# Patient Record
Sex: FEMALE | Race: WHITE | NOT HISPANIC OR LATINO | Employment: PART TIME | URBAN - METROPOLITAN AREA
[De-identification: names, ages, dates, MRNs, and addresses within clinical notes are randomized per-mention and may not be internally consistent; named-entity substitution may affect disease eponyms.]

---

## 2018-08-23 ENCOUNTER — OFFICE VISIT (OUTPATIENT)
Dept: INTERNAL MEDICINE | Age: 48
End: 2018-08-23

## 2018-08-23 VITALS
TEMPERATURE: 98.2 F | OXYGEN SATURATION: 99 % | WEIGHT: 289 LBS | BODY MASS INDEX: 56.74 KG/M2 | HEART RATE: 112 BPM | DIASTOLIC BLOOD PRESSURE: 84 MMHG | SYSTOLIC BLOOD PRESSURE: 122 MMHG | HEIGHT: 60 IN

## 2018-08-23 DIAGNOSIS — R06.83 SNORING: ICD-10-CM

## 2018-08-23 DIAGNOSIS — Z00.00 ROUTINE HEALTH MAINTENANCE: ICD-10-CM

## 2018-08-23 DIAGNOSIS — J30.89 ENVIRONMENTAL AND SEASONAL ALLERGIES: Chronic | ICD-10-CM

## 2018-08-23 DIAGNOSIS — G89.29 CHRONIC LEFT-SIDED LOW BACK PAIN WITHOUT SCIATICA: Chronic | ICD-10-CM

## 2018-08-23 DIAGNOSIS — E66.01 MORBID OBESITY (HCC): Primary | Chronic | ICD-10-CM

## 2018-08-23 DIAGNOSIS — F90.2 ATTENTION DEFICIT HYPERACTIVITY DISORDER (ADHD), COMBINED TYPE: Chronic | ICD-10-CM

## 2018-08-23 DIAGNOSIS — M54.50 CHRONIC LEFT-SIDED LOW BACK PAIN WITHOUT SCIATICA: Chronic | ICD-10-CM

## 2018-08-23 DIAGNOSIS — J45.20 MILD INTERMITTENT ASTHMA WITHOUT COMPLICATION: Chronic | ICD-10-CM

## 2018-08-23 PROBLEM — F51.04 CHRONIC INSOMNIA: Chronic | Status: ACTIVE | Noted: 2018-08-23

## 2018-08-23 PROBLEM — J45.909 ASTHMA: Status: ACTIVE | Noted: 2018-08-23

## 2018-08-23 PROBLEM — J45.909 ASTHMA: Chronic | Status: ACTIVE | Noted: 2018-08-23

## 2018-08-23 PROCEDURE — 99204 OFFICE O/P NEW MOD 45 MIN: CPT | Performed by: INTERNAL MEDICINE

## 2018-08-23 RX ORDER — NAPROXEN 500 MG/1
500 TABLET ORAL
COMMUNITY
Start: 2018-04-03 | End: 2018-08-23

## 2018-08-23 RX ORDER — FLUTICASONE PROPIONATE 50 MCG
1 SPRAY, SUSPENSION (ML) NASAL 2 TIMES DAILY
Qty: 1 BOTTLE | Refills: 5 | Status: SHIPPED | OUTPATIENT
Start: 2018-08-23

## 2018-08-23 RX ORDER — ALBUTEROL SULFATE 90 UG/1
2 AEROSOL, METERED RESPIRATORY (INHALATION) EVERY 4 HOURS PRN
Qty: 1 INHALER | Refills: 2 | Status: SHIPPED | OUTPATIENT
Start: 2018-08-23 | End: 2022-01-27 | Stop reason: SDUPTHER

## 2018-08-23 NOTE — ASSESSMENT & PLAN NOTE
· The following were discussed: low calorie, low carb based diet program, portion control, make dinner lightest meal of day, avoid eating sweets, desserts, and excess amount of fruits and increase physical activity, monitor steps taken daily

## 2018-08-23 NOTE — ASSESSMENT & PLAN NOTE
Persistent sacral and lumbar region back pain. Xray from [facility] reviewed showing spondylosis but no vertebral compression fracture. Started after a fall previously. Recommended PT and OTC NSAIDs as needed.

## 2018-08-24 LAB
ALBUMIN SERPL-MCNC: 4.4 G/DL (ref 3.5–5.2)
ALBUMIN/GLOB SERPL: 1.5 G/DL
ALP SERPL-CCNC: 70 U/L (ref 39–117)
ALT SERPL-CCNC: 21 U/L (ref 1–33)
AST SERPL-CCNC: 16 U/L (ref 1–32)
BASOPHILS # BLD AUTO: 0.03 10*3/MM3 (ref 0–0.2)
BASOPHILS NFR BLD AUTO: 0.3 % (ref 0–1.5)
BILIRUB SERPL-MCNC: 0.4 MG/DL (ref 0.1–1.2)
BUN SERPL-MCNC: 8 MG/DL (ref 6–20)
BUN/CREAT SERPL: 8.9 (ref 7–25)
CALCIUM SERPL-MCNC: 8.9 MG/DL (ref 8.6–10.5)
CHLORIDE SERPL-SCNC: 102 MMOL/L (ref 98–107)
CHOLEST SERPL-MCNC: 202 MG/DL (ref 0–200)
CHOLEST/HDLC SERPL: 4.04 {RATIO}
CO2 SERPL-SCNC: 24.7 MMOL/L (ref 22–29)
CREAT SERPL-MCNC: 0.9 MG/DL (ref 0.57–1)
EOSINOPHIL # BLD AUTO: 0.08 10*3/MM3 (ref 0–0.7)
EOSINOPHIL NFR BLD AUTO: 0.8 % (ref 0.3–6.2)
ERYTHROCYTE [DISTWIDTH] IN BLOOD BY AUTOMATED COUNT: 13.3 % (ref 11.7–13)
GLOBULIN SER CALC-MCNC: 3 GM/DL
GLUCOSE SERPL-MCNC: 95 MG/DL (ref 65–99)
HBA1C MFR BLD: 5.4 % (ref 4.8–5.6)
HCT VFR BLD AUTO: 42.4 % (ref 35.6–45.5)
HDLC SERPL-MCNC: 50 MG/DL (ref 40–60)
HGB BLD-MCNC: 13.6 G/DL (ref 11.9–15.5)
IMM GRANULOCYTES # BLD: 0.03 10*3/MM3 (ref 0–0.03)
IMM GRANULOCYTES NFR BLD: 0.3 % (ref 0–0.5)
LDLC SERPL CALC-MCNC: 130 MG/DL (ref 0–100)
LYMPHOCYTES # BLD AUTO: 2.17 10*3/MM3 (ref 0.9–4.8)
LYMPHOCYTES NFR BLD AUTO: 22.4 % (ref 19.6–45.3)
MCH RBC QN AUTO: 31.2 PG (ref 26.9–32)
MCHC RBC AUTO-ENTMCNC: 32.1 G/DL (ref 32.4–36.3)
MCV RBC AUTO: 97.2 FL (ref 80.5–98.2)
MONOCYTES # BLD AUTO: 0.54 10*3/MM3 (ref 0.2–1.2)
MONOCYTES NFR BLD AUTO: 5.6 % (ref 5–12)
NEUTROPHILS # BLD AUTO: 6.86 10*3/MM3 (ref 1.9–8.1)
NEUTROPHILS NFR BLD AUTO: 70.9 % (ref 42.7–76)
PLATELET # BLD AUTO: 318 10*3/MM3 (ref 140–500)
POTASSIUM SERPL-SCNC: 4.3 MMOL/L (ref 3.5–5.2)
PROT SERPL-MCNC: 7.4 G/DL (ref 6–8.5)
RBC # BLD AUTO: 4.36 10*6/MM3 (ref 3.9–5.2)
SODIUM SERPL-SCNC: 139 MMOL/L (ref 136–145)
T4 FREE SERPL-MCNC: 0.76 NG/DL (ref 0.93–1.7)
TRIGL SERPL-MCNC: 111 MG/DL (ref 0–150)
TSH SERPL DL<=0.005 MIU/L-ACNC: 15.19 MIU/ML (ref 0.27–4.2)
VLDLC SERPL CALC-MCNC: 22.2 MG/DL (ref 5–40)
WBC # BLD AUTO: 9.68 10*3/MM3 (ref 4.5–10.7)

## 2018-08-24 NOTE — PROGRESS NOTES
Call patient with her test result(s) and mail the results to her if MyChart is NOT active.    1.Thyroid level is low consistent with hypothyroidism. Start levothyroxine 50 mcg qAM. (Dx: hypothyroidism) (Instruct patient on proper way to take medication.) Recheck TSH and Free T4 in 2 months.   2. No diabetes.  3. Cholesterol is mildly elevated. Maintain low fat/cholesterol diet.  Will follow.   4. CBC okay.    Keep follow-up with me for January.

## 2018-08-27 ENCOUNTER — TELEPHONE (OUTPATIENT)
Dept: INTERNAL MEDICINE | Age: 48
End: 2018-08-27

## 2018-08-27 DIAGNOSIS — E03.9 ACQUIRED HYPOTHYROIDISM: Primary | ICD-10-CM

## 2018-08-27 RX ORDER — LEVOTHYROXINE SODIUM 0.05 MG/1
50 TABLET ORAL DAILY
Qty: 30 TABLET | Refills: 3 | Status: SHIPPED | OUTPATIENT
Start: 2018-08-27 | End: 2018-10-25 | Stop reason: SDUPTHER

## 2018-08-27 NOTE — ASSESSMENT & PLAN NOTE
Pt to start levothyroxine 50 mcg, instructed proper way to take, and pt to recheck TFT's in 2 months. YOU

## 2018-08-27 NOTE — TELEPHONE ENCOUNTER
----- Message from Luiz Uribe MD sent at 8/24/2018  5:06 PM EDT -----  Call patient with her test result(s) and mail the results to her if MyChart is NOT active.    1.Thyroid level is low consistent with hypothyroidism. Start levothyroxine 50 mcg qAM. (Dx: hypothyroidism) (Instruct patient on proper way to take medication.) Recheck TSH and Free T4 in 2 months.   2. No diabetes.  3. Cholesterol is mildly elevated. Maintain low fat/cholesterol diet.  Will follow.   4. CBC okay.    Keep follow-up with me for January.

## 2018-08-28 ENCOUNTER — APPOINTMENT (OUTPATIENT)
Dept: WOMENS IMAGING | Facility: HOSPITAL | Age: 48
End: 2018-08-28

## 2018-08-28 PROCEDURE — 77067 SCR MAMMO BI INCL CAD: CPT | Performed by: RADIOLOGY

## 2018-08-28 PROCEDURE — 77063 BREAST TOMOSYNTHESIS BI: CPT | Performed by: RADIOLOGY

## 2018-10-12 ENCOUNTER — APPOINTMENT (OUTPATIENT)
Dept: SLEEP MEDICINE | Facility: HOSPITAL | Age: 48
End: 2018-10-12

## 2018-10-23 DIAGNOSIS — E03.9 ACQUIRED HYPOTHYROIDISM: Primary | ICD-10-CM

## 2018-10-25 DIAGNOSIS — E03.9 ACQUIRED HYPOTHYROIDISM: ICD-10-CM

## 2018-10-25 RX ORDER — LEVOTHYROXINE SODIUM 0.1 MG/1
50 TABLET ORAL DAILY
Qty: 30 TABLET | Refills: 3 | Status: SHIPPED | OUTPATIENT
Start: 2018-10-25 | End: 2018-12-14 | Stop reason: SDUPTHER

## 2018-10-25 NOTE — PROGRESS NOTES
Call patient with her test result(s) and mail the results to her if MyChart is NOT active.    Thyroid level is still quite low. Increase levothyroxine to 100 mcg qAM (verify on 50 mcg). Recheck TFT's in 6 weeks along with thyroid peroxidase antibody.  Keep appointment with me for January.

## 2018-10-25 NOTE — ASSESSMENT & PLAN NOTE
Pt to increase levothyroxine to 100 mcg and recheck TFT's including thyroid peroxidase antibody per Dr Uribe. YOU

## 2018-10-26 LAB
T4 FREE SERPL-MCNC: 0.88 NG/DL (ref 0.93–1.7)
TSH SERPL DL<=0.005 MIU/L-ACNC: 30.18 MIU/ML (ref 0.27–4.2)

## 2018-12-12 DIAGNOSIS — E03.9 ACQUIRED HYPOTHYROIDISM: Primary | ICD-10-CM

## 2018-12-13 LAB
T4 FREE SERPL-MCNC: 1.05 NG/DL (ref 0.93–1.7)
TSH SERPL DL<=0.005 MIU/L-ACNC: 27.92 MIU/ML (ref 0.27–4.2)

## 2018-12-14 ENCOUNTER — TELEPHONE (OUTPATIENT)
Dept: INTERNAL MEDICINE | Age: 48
End: 2018-12-14

## 2018-12-14 DIAGNOSIS — E03.9 ACQUIRED HYPOTHYROIDISM: ICD-10-CM

## 2018-12-14 LAB
Lab: NORMAL
Lab: NORMAL

## 2018-12-14 RX ORDER — LEVOTHYROXINE SODIUM 0.05 MG/1
150 TABLET ORAL DAILY
Qty: 90 TABLET | Refills: 0 | Status: SHIPPED | OUTPATIENT
Start: 2018-12-14 | End: 2019-01-11 | Stop reason: SDUPTHER

## 2018-12-14 NOTE — TELEPHONE ENCOUNTER
----- Message from Luiz Uribe MD sent at 12/14/2018  8:04 AM EST -----  Call patient with her test result(s) and mail the results to her if MyChart is NOT active.    Increase levothyroxine to 125 mcg (confirm on 100 mcg). Recheck TFT's 2 months     Refer to separate staff message about adding thyroid peroxidase.

## 2018-12-14 NOTE — TELEPHONE ENCOUNTER
Results sent to my chart for review. Medication sent to local pharmacy for pickup. Pt to increase levothyroxine to 125 mcg and recheck thyroid labs in 2 months. Per Dr Uribe. YOU

## 2018-12-15 LAB
THYROPEROXIDASE AB SERPL-ACNC: 340 IU/ML (ref 0–34)
WRITTEN AUTHORIZATION: NORMAL

## 2019-01-09 DIAGNOSIS — Z00.00 PREVENTATIVE HEALTH CARE: Primary | ICD-10-CM

## 2019-01-10 LAB
ALBUMIN SERPL-MCNC: 4.1 G/DL (ref 3.5–5.2)
ALBUMIN/GLOB SERPL: 1.3 G/DL
ALP SERPL-CCNC: 66 U/L (ref 39–117)
ALT SERPL-CCNC: 16 U/L (ref 1–33)
APPEARANCE UR: CLEAR
AST SERPL-CCNC: 15 U/L (ref 1–32)
BASOPHILS # BLD AUTO: 0.03 10*3/MM3 (ref 0–0.2)
BASOPHILS NFR BLD AUTO: 0.3 % (ref 0–1.5)
BILIRUB SERPL-MCNC: 0.2 MG/DL (ref 0.1–1.2)
BILIRUB UR QL STRIP: NEGATIVE
BUN SERPL-MCNC: 10 MG/DL (ref 6–20)
BUN/CREAT SERPL: 11 (ref 7–25)
CALCIUM SERPL-MCNC: 9.5 MG/DL (ref 8.6–10.5)
CHLORIDE SERPL-SCNC: 101 MMOL/L (ref 98–107)
CHOLEST SERPL-MCNC: 180 MG/DL (ref 0–200)
CHOLEST/HDLC SERPL: 3.46 {RATIO}
CO2 SERPL-SCNC: 26.5 MMOL/L (ref 22–29)
COLOR UR: YELLOW
CREAT SERPL-MCNC: 0.91 MG/DL (ref 0.57–1)
EOSINOPHIL # BLD AUTO: 0.07 10*3/MM3 (ref 0–0.7)
EOSINOPHIL NFR BLD AUTO: 0.8 % (ref 0.3–6.2)
ERYTHROCYTE [DISTWIDTH] IN BLOOD BY AUTOMATED COUNT: 14.4 % (ref 11.7–13)
GLOBULIN SER CALC-MCNC: 3.1 GM/DL
GLUCOSE SERPL-MCNC: 94 MG/DL (ref 65–99)
GLUCOSE UR QL: NEGATIVE
HBA1C MFR BLD: 5.2 % (ref 4.8–5.6)
HCT VFR BLD AUTO: 44.7 % (ref 35.6–45.5)
HDLC SERPL-MCNC: 52 MG/DL (ref 40–60)
HGB BLD-MCNC: 13.7 G/DL (ref 11.9–15.5)
HGB UR QL STRIP: NEGATIVE
IMM GRANULOCYTES # BLD AUTO: 0.03 10*3/MM3 (ref 0–0.03)
IMM GRANULOCYTES NFR BLD AUTO: 0.3 % (ref 0–0.5)
KETONES UR QL STRIP: NEGATIVE
LDLC SERPL CALC-MCNC: 112 MG/DL (ref 0–100)
LEUKOCYTE ESTERASE UR QL STRIP: NEGATIVE
LYMPHOCYTES # BLD AUTO: 2.24 10*3/MM3 (ref 0.9–4.8)
LYMPHOCYTES NFR BLD AUTO: 24.2 % (ref 19.6–45.3)
MCH RBC QN AUTO: 30.6 PG (ref 26.9–32)
MCHC RBC AUTO-ENTMCNC: 30.6 G/DL (ref 32.4–36.3)
MCV RBC AUTO: 99.8 FL (ref 80.5–98.2)
MONOCYTES # BLD AUTO: 0.6 10*3/MM3 (ref 0.2–1.2)
MONOCYTES NFR BLD AUTO: 6.5 % (ref 5–12)
NEUTROPHILS # BLD AUTO: 6.29 10*3/MM3 (ref 1.9–8.1)
NEUTROPHILS NFR BLD AUTO: 67.9 % (ref 42.7–76)
NITRITE UR QL STRIP: NEGATIVE
PH UR STRIP: 6 [PH] (ref 5–8)
PLATELET # BLD AUTO: 303 10*3/MM3 (ref 140–500)
POTASSIUM SERPL-SCNC: 4.3 MMOL/L (ref 3.5–5.2)
PROT SERPL-MCNC: 7.2 G/DL (ref 6–8.5)
PROT UR QL STRIP: NEGATIVE
RBC # BLD AUTO: 4.48 10*6/MM3 (ref 3.9–5.2)
SODIUM SERPL-SCNC: 141 MMOL/L (ref 136–145)
SP GR UR: 1.02 (ref 1–1.03)
T4 FREE SERPL-MCNC: 0.37 NG/DL (ref 0.93–1.7)
TRIGL SERPL-MCNC: 78 MG/DL (ref 0–150)
TSH SERPL DL<=0.005 MIU/L-ACNC: 18.36 MIU/ML (ref 0.27–4.2)
UROBILINOGEN UR STRIP-MCNC: NORMAL MG/DL
VLDLC SERPL CALC-MCNC: 15.6 MG/DL (ref 5–40)
WBC # BLD AUTO: 9.26 10*3/MM3 (ref 4.5–10.7)

## 2019-01-11 ENCOUNTER — TELEPHONE (OUTPATIENT)
Dept: INTERNAL MEDICINE | Age: 49
End: 2019-01-11

## 2019-01-11 DIAGNOSIS — E03.9 ACQUIRED HYPOTHYROIDISM: ICD-10-CM

## 2019-01-11 RX ORDER — LEVOTHYROXINE SODIUM 0.15 MG/1
150 TABLET ORAL DAILY
Qty: 30 TABLET | Refills: 3 | Status: SHIPPED | OUTPATIENT
Start: 2019-01-11 | End: 2019-07-18 | Stop reason: SDUPTHER

## 2019-01-11 NOTE — TELEPHONE ENCOUNTER
Called pt with results    Please advise pt was supposed to be taking 125mcg daily and not 50 mcg stated on result not.     She picked up a prescription that stated 50 mcg 3 times daily 150 mcg total. That's  What she has been taking.    She has been speaking with Dr Stephen alvarez ENT, endo specialist who is her brother in law in [place], and he told her to stop all meds and start liothyronine 25 mcg 1 week ago before labs for her CPE was drawn. She states that she is feeling horrible and doesn't know what to do. But she will listen to wwhatever Dr Uribe would like to do. She states that Dr Alvarez was going to try to contact you to talk about some spesific lab to run on her.

## 2019-01-11 NOTE — TELEPHONE ENCOUNTER
----- Message from Luiz Uribe MD sent at 1/11/2019  7:19 AM EST -----  Call patient with her test result(s) and mail the results to her if MyChart is NOT active.    Upcoming CPE labs reviewed. Thyroid is too low. Increase levothyroxine to 88 mcg (verify on 50 mcg). Recheck TSH and Free T4 in 2 months.   Will see you for CPE.

## 2019-01-11 NOTE — TELEPHONE ENCOUNTER
Called pt with results     Please advise pt was supposed to be taking 125mcg daily and not 50 mcg as stated to increase to 88 mcg stated on result note.      She picked up a prescription that stated 50 mcg 3 times daily 150 mcg total. That's  What she has been taking.     She has been speaking with Dr Stephen alvarez ENT, endo specialist who is her brother in law in [place], and he told her to stop all meds and start liothyronine 25 mcg 1 week ago before labs for her CPE was drawn. She states that she is feeling horrible and doesn't know what to do. But she will listen to wwhatever Dr Uribe would like to do. She states that Dr Alvarez was going to try to contact you to talk about some spesific lab to run on her.

## 2019-01-15 ENCOUNTER — OFFICE VISIT (OUTPATIENT)
Dept: INTERNAL MEDICINE | Age: 49
End: 2019-01-15

## 2019-01-15 VITALS
HEIGHT: 60 IN | DIASTOLIC BLOOD PRESSURE: 78 MMHG | OXYGEN SATURATION: 98 % | BODY MASS INDEX: 54.97 KG/M2 | TEMPERATURE: 98.2 F | HEART RATE: 94 BPM | WEIGHT: 280 LBS | SYSTOLIC BLOOD PRESSURE: 126 MMHG

## 2019-01-15 DIAGNOSIS — B00.1 COLD SORE: ICD-10-CM

## 2019-01-15 DIAGNOSIS — E66.01 MORBID OBESITY (HCC): Chronic | ICD-10-CM

## 2019-01-15 DIAGNOSIS — Z00.00 ENCOUNTER FOR ANNUAL HEALTH EXAMINATION: Primary | ICD-10-CM

## 2019-01-15 DIAGNOSIS — E06.3 HYPOTHYROIDISM DUE TO HASHIMOTO'S THYROIDITIS: Chronic | ICD-10-CM

## 2019-01-15 DIAGNOSIS — E03.8 HYPOTHYROIDISM DUE TO HASHIMOTO'S THYROIDITIS: Chronic | ICD-10-CM

## 2019-01-15 PROCEDURE — 99213 OFFICE O/P EST LOW 20 MIN: CPT | Performed by: INTERNAL MEDICINE

## 2019-01-15 PROCEDURE — 99396 PREV VISIT EST AGE 40-64: CPT | Performed by: INTERNAL MEDICINE

## 2019-01-15 PROCEDURE — 90632 HEPA VACCINE ADULT IM: CPT | Performed by: INTERNAL MEDICINE

## 2019-01-15 PROCEDURE — 90471 IMMUNIZATION ADMIN: CPT | Performed by: INTERNAL MEDICINE

## 2019-01-15 RX ORDER — VALACYCLOVIR HYDROCHLORIDE 1 G/1
TABLET, FILM COATED ORAL
Qty: 4 TABLET | Refills: 0 | Status: SHIPPED | OUTPATIENT
Start: 2019-01-15 | End: 2019-02-01

## 2019-01-15 RX ORDER — LIOTHYRONINE SODIUM 25 UG/1
TABLET ORAL
Refills: 1 | COMMUNITY
Start: 2018-12-24 | End: 2019-01-15

## 2019-01-15 NOTE — ASSESSMENT & PLAN NOTE
Previously she was taking 150 mcg levothyroxine (3 x 50 mcg, error by MA). Her brother-in-law (ENT) started her on Cytomel 25 mcg and told her to stop levothyroxine). We clarified the issue with patient on 1/11/19 and resumed levothyroxine at 150 mcg dose once daily.     Will recheck TSH and Free T4 in February (scheduled already).

## 2019-01-15 NOTE — ASSESSMENT & PLAN NOTE
Wt Readings from Last 3 Encounters:   01/15/19 127 kg (280 lb)   08/23/18 131 kg (289 lb)      Maintain a low sugar/starch/carbohydrate diet and exercise regularly. Wt loss advised.

## 2019-01-15 NOTE — PROGRESS NOTES
[facility]  SHARRI FARRIS M.D.      Maria Esther HENRIQUEZ Godfrey / 48 y.o. / female  01/15/2019    CC:  Annual Exam and URI (x 2 days)      HPI:      Maria Esther presents for annual health maintenance visit.    There was a mix up in thyroid medication regimen and was taking levothyroxine 50 mcg THREE tabs daily (total of 150 mcg).  Her ENT brother in law had her stop this and start Cytomel 25 mcg. Her labs were low and was recently just started back on levothyroxine 150 mcg.   Thyroid peroxidase antibody was positive consistent with Hashimoto's.   Overall she is feeling better.     Complains of cold sore on lower lip. Has a viral URI currently.     · Last health maintenance visit: unsure  · General health: poor  · Lifestyle:  · Attempting to lose weight?: Yes   · Diet: could be better  · Exercise: limited currently  · Tobacco: Remote history (short-term)   · Alcohol: occasional/rare  · Work: Full-time  · Reproductive health:  · Sexually active?: Yes   · Sexual problems?: No problems  · Concern for STD?: No    · Sees Gynecologist?: Yes   · Livier/Postmenopausal?: No   · Domestic abuse concerns: No   · Depression Screening:      PHQ-2/PHQ-9 Depression Screening 1/15/2019   Little interest or pleasure in doing things 0   Feeling down, depressed, or hopeless 0   Total Score 0         PHQ-2: 0 (Not depressed)   PHQ-9:     Patient Care Team:  Luiz Farris MD as PCP - General (Internal Medicine)  ______________________________________________________________________    ALLERGIES  Allergies   Allergen Reactions   • Apple Hives   • Peanut Oil Anaphylaxis   • Penicillins Hives and Shortness Of Breath   • Codeine Hives and Nausea And Vomiting   • Epinephrine Nausea And Vomiting, Palpitations and Photosensitivity        MEDICATIONS  Current Outpatient Medications on File Prior to Visit   Medication Sig   • albuterol (VENTOLIN HFA) 108 (90 Base) MCG/ACT inhaler Inhale 2 puffs Every 4 (Four) Hours As Needed for Wheezing or Shortness of Air.   •  Cholecalciferol (VITAMIN D3) 5000 units capsule capsule Take 5,000 Units by mouth Daily.   • fluticasone (FLONASE) 50 MCG/ACT nasal spray 1 spray into the nostril(s) as directed by provider 2 (Two) Times a Day.   • levothyroxine (SYNTHROID, LEVOTHROID) 150 MCG tablet Take 1 tablet by mouth Daily.   • lisdexamfetamine (VYVANSE) 50 MG capsule Take 50 mg by mouth   • [DISCONTINUED] liothyronine (CYTOMEL) 25 MCG tablet      No current facility-administered medications on file prior to visit.        PFSH:     The following portions of the patient's history were reviewed and updated as appropriate: Allergies / Current Medications / Past Medical History / Surgical History / Social History / Family History    PROBLEM LIST   Patient Active Problem List   Diagnosis   • Asthma   • Environmental and seasonal allergies   • Morbid obesity (CMS/HCC)   • Attention deficit hyperactivity disorder (ADHD), combined type   • Chronic left-sided low back pain   • Chronic insomnia   • Hypothyroidism due to Hashimoto's thyroiditis   • Cold sore       PAST MEDICAL HISTORY  Past Medical History:   Diagnosis Date   • ADHD (attention deficit hyperactivity disorder)    • Asthma    • History of bulimia     teenage years/young adult   • Hypothyroidism    • Insomnia    • Obesity        SURGICAL HISTORY  Past Surgical History:   Procedure Laterality Date   • BREAST AUGMENTATION      twice ( & )   • LIPOSUCTION     • UMBILICAL HERNIA REPAIR         SOCIAL HISTORY  Social History     Socioeconomic History   • Marital status:      Spouse name: Paco*   • Number of children: 0   • Years of education: Not on file   • Highest education level: Not on file   Occupational History   • Occupation: RN      Comment: Psychiatric nurse (adolescent)   Tobacco Use   • Smoking status: Former Smoker     Years: 5.00     Types: Cigarettes     Last attempt to quit: 2014     Years since quittin.0   • Smokeless tobacco: Never Used   Substance and  "Sexual Activity   • Alcohol use: No   • Drug use: No   • Sexual activity: Yes     Partners: Male       FAMILY HISTORY  Family History   Problem Relation Age of Onset   • Kidney disease Mother    • Valvular heart disease Mother    • Alzheimer's disease Father 70   • Bipolar disorder Father    • Alzheimer's disease Paternal Aunt 70   • Bipolar disorder Sister    • Bipolar disorder Brother    • Bipolar disorder Sister    • ADD / ADHD Sister    • Cervical cancer Maternal Grandmother    • Alzheimer's disease Paternal Grandmother    • Colon cancer Neg Hx    • Breast cancer Neg Hx    • Diabetes Neg Hx        IMMUNIZATION HISTORY  Immunization History   Administered Date(s) Administered   • Flu Vaccine Quad PF >18YRS 09/26/2018   • Tdap 01/01/2016       ______________________________________________________________________    REVIEW OF SYSTEMS    Review of Systems   Constitutional: Negative.    HENT: Negative.         Cold sore   Eyes: Negative.    Respiratory: Negative.    Cardiovascular: Negative.    Gastrointestinal: Negative.    Endocrine: Negative.    Genitourinary: Negative.    Musculoskeletal: Negative.    Skin: Negative.    Allergic/Immunologic: Positive for environmental allergies.   Neurological: Negative.    Hematological: Negative.    Psychiatric/Behavioral: Positive for sleep disturbance.         VITALS:    Visit Vitals  /78   Pulse 94   Temp 98.2 °F (36.8 °C)   Ht 151.8 cm (59.76\")   Wt 127 kg (280 lb)   SpO2 98%   BMI 55.12 kg/m²       BP Readings from Last 3 Encounters:   01/15/19 126/78   08/23/18 122/84     Wt Readings from Last 3 Encounters:   01/15/19 127 kg (280 lb)   08/23/18 131 kg (289 lb)      Body mass index is 55.12 kg/m².    PHYSICAL EXAMINATION    Physical Exam   Constitutional: She is oriented to person, place, and time. She appears well-developed and well-nourished. No distress.   Obese    HENT:   Head: Normocephalic and atraumatic.   Right Ear: External ear normal.   Left Ear: External " ear normal.   Nose: Nose normal.   Mouth/Throat: Oropharynx is clear and moist.       Mellampati Airway Class 4    Eyes: Conjunctivae and EOM are normal. Pupils are equal, round, and reactive to light. No scleral icterus.   Neck: Normal range of motion. Neck supple. No tracheal deviation present. No thyroid mass and no thyromegaly present.   Cardiovascular: Normal rate, regular rhythm, normal heart sounds and intact distal pulses.   Pulmonary/Chest: Effort normal and breath sounds normal.   Abdominal: Soft. Bowel sounds are normal. She exhibits no distension and no mass. There is no tenderness. No hernia.   Protuberant     Genitourinary:   Genitourinary Comments: Deferred to gyne/by patient unless specified otherwise.    Musculoskeletal: She exhibits no edema or deformity.   Neurological: She is alert and oriented to person, place, and time. She has normal reflexes. No cranial nerve deficit. She exhibits normal muscle tone. Coordination normal.   Skin: Skin is warm. No rash noted. No pallor.   Psychiatric: She has a normal mood and affect. Her behavior is normal. Judgment and thought content normal.         REVIEWED DATA    Labs:    Lab Results   Component Value Date     01/10/2019    K 4.3 01/10/2019    AST 15 01/10/2019    ALT 16 01/10/2019    BUN 10 01/10/2019    CREATININE 0.91 01/10/2019    CREATININE 0.90 08/23/2018    EGFRIFNONA 66 01/10/2019    EGFRIFAFRI 80 01/10/2019       Lab Results   Component Value Date    HGBA1C 5.20 01/10/2019    HGBA1C 5.40 08/23/2018    TSH 18.360 (H) 01/10/2019    FREET4 0.37 (L) 01/10/2019       Lab Results   Component Value Date     (H) 01/10/2019    HDL 52 01/10/2019    TRIG 78 01/10/2019    CHOLHDLRATIO 3.46 01/10/2019       No results found for: [id number]     Lab Results   Component Value Date    WBC 9.26 01/10/2019    HGB 13.7 01/10/2019    MCV 99.8 (H) 01/10/2019     01/10/2019       Lab Results   Component Value Date    PROTEIN Negative 01/10/2019     GLUCOSEU Negative 01/10/2019    BLOODU Negative 01/10/2019    NITRITEU Negative 01/10/2019    LEUKOCYTESUR Negative 01/10/2019        No results found for: HEPCVIRUSABY    Imaging:        Medical Tests:      ______________________________________________________________________    ASSESSMENT & PLAN    ANNUAL WELLNESS EXAM / PHYSICAL     Other medical problems addressed today:  Problem List Items Addressed This Visit        High    Morbid obesity (CMS/HCC) (Chronic)    Current Assessment & Plan     Wt Readings from Last 3 Encounters:   01/15/19 127 kg (280 lb)   08/23/18 131 kg (289 lb)      Maintain a low sugar/starch/carbohydrate diet and exercise regularly. Wt loss advised.           Hypothyroidism due to Hashimoto's thyroiditis (Chronic)    Current Assessment & Plan     Previously she was taking 150 mcg levothyroxine (3 x 50 mcg, error by MA). Her brother-in-law (ENT) started her on Cytomel 25 mcg and told her to stop levothyroxine). We clarified the issue with patient on 1/11/19 and resumed levothyroxine at 150 mcg dose once daily.     Will recheck TSH and Free T4 in February (scheduled already).          Relevant Medications    levothyroxine (SYNTHROID, LEVOTHROID) 150 MCG tablet       Unprioritized    Cold sore    Current Assessment & Plan     Valtrex PRN. New Rx sent to her pharmacy.          Relevant Medications    valACYclovir (VALTREX) 1000 MG tablet      Other Visit Diagnoses     Encounter for annual health examination    -  Primary    Relevant Orders    Hepatitis A Vaccine Adult IM    Ambulatory Referral For Screening Colonoscopy          Summary/Discussion:     Primary reason for today's visit was for annual health examination. However above active medical issues also needed to be addressed today.        Return in about 6 months (around 7/15/2019) for Reassess chronic medical problems.    Future Appointments   Date Time Provider Department Center   2/12/2019  8:30 AM [facility] [address] [facility]  None       HEALTHCARE MAINTENANCE ISSUES:    Cancer Screening:  · Colon: Initial/Next screening at age: DUE NOW  · Repeat colonoscopy N/A at this time  · Breast: Recommended monthly self exams; annual professional exam  · Mammogram: every 1 year or every 2 years  · Cervical: Instructed to see gynecologist for pap smear; check every 2-3 years  · Skin: Monthly self skin examination, annual exam by health professional  · Lung:   · Other:    Screening Labs & Tests:  · Lab results reviewed & discussed with the patient or test orders placed today.  · EKG:  · Vascular Screening:   · DEXA (65+ or postmenopausal with risk factors):   · HEP C (If born [id number], or risk factors): Not indicated    Immunization/Vaccinations (to be given today unless deferred by patient)  · Influenza: Patient had the flu shot this season  · Hepatitis A: Administer today and 2nd shot in 6 months  · Tetanus/Pertussis: Up to date  · Pneumovax: Not needed at this time  · Prevnar 13: Not needed at this time  · Shingles: Not needed at this time  · Other:     Lifestyle Counseling:  · Lifestyle Modifications: Attempt to lose weight, Improve dietary compliance, Begin progressive aerobic exercise program 3-5 days a week, Maintain a low sugar/carbohydrate diet, Follow a low fat, low cholesterol diet and Make dinner the lightest meal of day  · Safety Issues: Always wear seatbelt, Avoid texting while driving   · Use sunscreen, regular skin examination  · Recommended annual dental/vision examination.  · Emotional/Stress/Sleep: Reviewed and  given when appropriate      Health Maintenance   Topic Date Due   • PAP SMEAR  08/23/2018   • ANNUAL PHYSICAL  01/16/2020   • TDAP/TD VACCINES (2 - Td) 01/01/2026   • INFLUENZA VACCINE  Addressed         **Dhiraj Disclaimer:   Much of this encounter note is an electronic transcription/translation of spoken language to printed text. The electronic translation of spoken language may permit erroneous, or at times,  nonsensical words or phrases to be inadvertently transcribed. Although I have reviewed the note for such errors, some may still exist.

## 2019-02-01 ENCOUNTER — HOSPITAL ENCOUNTER (OUTPATIENT)
Facility: HOSPITAL | Age: 49
Discharge: HOME | End: 2019-02-02
Attending: EMERGENCY MEDICINE | Admitting: EMERGENCY MEDICINE

## 2019-02-01 ENCOUNTER — APPOINTMENT (OUTPATIENT)
Dept: CT IMAGING | Facility: HOSPITAL | Age: 49
End: 2019-02-01

## 2019-02-01 DIAGNOSIS — K81.0 ACUTE CHOLECYSTITIS: ICD-10-CM

## 2019-02-01 DIAGNOSIS — K82.9 GALLBLADDER PROBLEM: ICD-10-CM

## 2019-02-01 DIAGNOSIS — R10.9 ABDOMINAL PAIN, UNSPECIFIED ABDOMINAL LOCATION: Primary | ICD-10-CM

## 2019-02-01 DIAGNOSIS — R11.0 NAUSEA: ICD-10-CM

## 2019-02-01 DIAGNOSIS — K80.50 BILIARY COLIC: ICD-10-CM

## 2019-02-01 LAB
ALBUMIN SERPL-MCNC: 4.1 G/DL (ref 3.5–5.2)
ALBUMIN/GLOB SERPL: 1.2 G/DL
ALP SERPL-CCNC: 63 U/L (ref 39–117)
ALT SERPL W P-5'-P-CCNC: 17 U/L (ref 1–33)
ANION GAP SERPL CALCULATED.3IONS-SCNC: 14.8 MMOL/L
AST SERPL-CCNC: 17 U/L (ref 1–32)
BACTERIA UR QL AUTO: ABNORMAL /HPF
BASOPHILS # BLD AUTO: 0.03 10*3/MM3 (ref 0–0.2)
BASOPHILS NFR BLD AUTO: 0.3 % (ref 0–1.5)
BILIRUB SERPL-MCNC: 0.5 MG/DL (ref 0.1–1.2)
BILIRUB UR QL STRIP: NEGATIVE
BUN BLD-MCNC: 12 MG/DL (ref 6–20)
BUN/CREAT SERPL: 12.9 (ref 7–25)
CALCIUM SPEC-SCNC: 9.2 MG/DL (ref 8.6–10.5)
CHLORIDE SERPL-SCNC: 101 MMOL/L (ref 98–107)
CLARITY UR: CLEAR
CO2 SERPL-SCNC: 21.2 MMOL/L (ref 22–29)
COLOR UR: YELLOW
CREAT BLD-MCNC: 0.93 MG/DL (ref 0.57–1)
DEPRECATED RDW RBC AUTO: 48.3 FL (ref 37–54)
EOSINOPHIL # BLD AUTO: 0.12 10*3/MM3 (ref 0–0.7)
EOSINOPHIL NFR BLD AUTO: 1 % (ref 0.3–6.2)
ERYTHROCYTE [DISTWIDTH] IN BLOOD BY AUTOMATED COUNT: 14 % (ref 11.7–13)
GFR SERPL CREATININE-BSD FRML MDRD: 65 ML/MIN/1.73
GLOBULIN UR ELPH-MCNC: 3.3 GM/DL
GLUCOSE BLD-MCNC: 96 MG/DL (ref 65–99)
GLUCOSE UR STRIP-MCNC: NEGATIVE MG/DL
HCT VFR BLD AUTO: 43.7 % (ref 35.6–45.5)
HGB BLD-MCNC: 14.6 G/DL (ref 11.9–15.5)
HGB UR QL STRIP.AUTO: ABNORMAL
HYALINE CASTS UR QL AUTO: ABNORMAL /LPF
IMM GRANULOCYTES # BLD AUTO: 0.02 10*3/MM3 (ref 0–0.03)
IMM GRANULOCYTES NFR BLD AUTO: 0.2 % (ref 0–0.5)
KETONES UR QL STRIP: NEGATIVE
LEUKOCYTE ESTERASE UR QL STRIP.AUTO: NEGATIVE
LIPASE SERPL-CCNC: 17 U/L (ref 13–60)
LYMPHOCYTES # BLD AUTO: 2.47 10*3/MM3 (ref 0.9–4.8)
LYMPHOCYTES NFR BLD AUTO: 20.7 % (ref 19.6–45.3)
MAGNESIUM SERPL-MCNC: 2.1 MG/DL (ref 1.6–2.6)
MCH RBC QN AUTO: 31.7 PG (ref 26.9–32)
MCHC RBC AUTO-ENTMCNC: 33.4 G/DL (ref 32.4–36.3)
MCV RBC AUTO: 95 FL (ref 80.5–98.2)
MONOCYTES # BLD AUTO: 0.88 10*3/MM3 (ref 0.2–1.2)
MONOCYTES NFR BLD AUTO: 7.4 % (ref 5–12)
NEUTROPHILS # BLD AUTO: 8.45 10*3/MM3 (ref 1.9–8.1)
NEUTROPHILS NFR BLD AUTO: 70.6 % (ref 42.7–76)
NITRITE UR QL STRIP: NEGATIVE
PH UR STRIP.AUTO: 5.5 [PH] (ref 5–8)
PLATELET # BLD AUTO: 317 10*3/MM3 (ref 140–500)
PMV BLD AUTO: 10 FL (ref 6–12)
POTASSIUM BLD-SCNC: 4 MMOL/L (ref 3.5–5.2)
PROT SERPL-MCNC: 7.4 G/DL (ref 6–8.5)
PROT UR QL STRIP: NEGATIVE
RBC # BLD AUTO: 4.6 10*6/MM3 (ref 3.9–5.2)
RBC # UR: ABNORMAL /HPF
REF LAB TEST METHOD: ABNORMAL
SODIUM BLD-SCNC: 137 MMOL/L (ref 136–145)
SP GR UR STRIP: 1.02 (ref 1–1.03)
SQUAMOUS #/AREA URNS HPF: ABNORMAL /HPF
UROBILINOGEN UR QL STRIP: ABNORMAL
WBC NRBC COR # BLD: 11.95 10*3/MM3 (ref 4.5–10.7)
WBC UR QL AUTO: ABNORMAL /HPF

## 2019-02-01 PROCEDURE — G0378 HOSPITAL OBSERVATION PER HR: HCPCS

## 2019-02-01 PROCEDURE — 99284 EMERGENCY DEPT VISIT MOD MDM: CPT

## 2019-02-01 PROCEDURE — 96376 TX/PRO/DX INJ SAME DRUG ADON: CPT

## 2019-02-01 PROCEDURE — 25010000002 LEVOFLOXACIN PER 250 MG: Performed by: SURGERY

## 2019-02-01 PROCEDURE — 80053 COMPREHEN METABOLIC PANEL: CPT | Performed by: EMERGENCY MEDICINE

## 2019-02-01 PROCEDURE — 74177 CT ABD & PELVIS W/CONTRAST: CPT

## 2019-02-01 PROCEDURE — 25010000002 PROMETHAZINE PER 50 MG: Performed by: SURGERY

## 2019-02-01 PROCEDURE — 85025 COMPLETE CBC W/AUTO DIFF WBC: CPT | Performed by: EMERGENCY MEDICINE

## 2019-02-01 PROCEDURE — 83690 ASSAY OF LIPASE: CPT | Performed by: EMERGENCY MEDICINE

## 2019-02-01 PROCEDURE — 96375 TX/PRO/DX INJ NEW DRUG ADDON: CPT

## 2019-02-01 PROCEDURE — 83735 ASSAY OF MAGNESIUM: CPT | Performed by: EMERGENCY MEDICINE

## 2019-02-01 PROCEDURE — 96367 TX/PROPH/DG ADDL SEQ IV INF: CPT

## 2019-02-01 PROCEDURE — 25010000002 HYDROMORPHONE PER 4 MG: Performed by: EMERGENCY MEDICINE

## 2019-02-01 PROCEDURE — 25010000002 ONDANSETRON PER 1 MG: Performed by: EMERGENCY MEDICINE

## 2019-02-01 PROCEDURE — 99219 PR INITIAL OBSERVATION CARE/DAY 50 MINUTES: CPT | Performed by: SURGERY

## 2019-02-01 PROCEDURE — 25010000002 HYDROMORPHONE 1 MG/ML SOLUTION: Performed by: EMERGENCY MEDICINE

## 2019-02-01 PROCEDURE — 81001 URINALYSIS AUTO W/SCOPE: CPT | Performed by: EMERGENCY MEDICINE

## 2019-02-01 PROCEDURE — 96361 HYDRATE IV INFUSION ADD-ON: CPT

## 2019-02-01 PROCEDURE — 25010000002 IOPAMIDOL 61 % SOLUTION: Performed by: EMERGENCY MEDICINE

## 2019-02-01 PROCEDURE — 96365 THER/PROPH/DIAG IV INF INIT: CPT

## 2019-02-01 RX ORDER — LEVOFLOXACIN 5 MG/ML
500 INJECTION, SOLUTION INTRAVENOUS EVERY 24 HOURS
Status: DISCONTINUED | OUTPATIENT
Start: 2019-02-01 | End: 2019-02-02 | Stop reason: HOSPADM

## 2019-02-01 RX ORDER — SODIUM CHLORIDE 0.9 % (FLUSH) 0.9 %
3 SYRINGE (ML) INJECTION EVERY 12 HOURS SCHEDULED
Status: DISCONTINUED | OUTPATIENT
Start: 2019-02-01 | End: 2019-02-02 | Stop reason: HOSPADM

## 2019-02-01 RX ORDER — PROMETHAZINE HYDROCHLORIDE 25 MG/ML
12.5 INJECTION, SOLUTION INTRAMUSCULAR; INTRAVENOUS EVERY 6 HOURS PRN
Status: DISCONTINUED | OUTPATIENT
Start: 2019-02-01 | End: 2019-02-02 | Stop reason: HOSPADM

## 2019-02-01 RX ORDER — LEVOFLOXACIN 5 MG/ML
500 INJECTION, SOLUTION INTRAVENOUS ONCE
Status: DISCONTINUED | OUTPATIENT
Start: 2019-02-01 | End: 2019-02-01

## 2019-02-01 RX ORDER — ONDANSETRON 2 MG/ML
4 INJECTION INTRAMUSCULAR; INTRAVENOUS EVERY 4 HOURS PRN
Status: DISCONTINUED | OUTPATIENT
Start: 2019-02-01 | End: 2019-02-02 | Stop reason: HOSPADM

## 2019-02-01 RX ORDER — HYDROMORPHONE HYDROCHLORIDE 1 MG/ML
0.5 INJECTION, SOLUTION INTRAMUSCULAR; INTRAVENOUS; SUBCUTANEOUS
Status: DISCONTINUED | OUTPATIENT
Start: 2019-02-01 | End: 2019-02-02 | Stop reason: HOSPADM

## 2019-02-01 RX ORDER — ONDANSETRON 2 MG/ML
4 INJECTION INTRAMUSCULAR; INTRAVENOUS ONCE
Status: COMPLETED | OUTPATIENT
Start: 2019-02-01 | End: 2019-02-01

## 2019-02-01 RX ORDER — PROMETHAZINE HYDROCHLORIDE 25 MG/ML
6.25 INJECTION, SOLUTION INTRAMUSCULAR; INTRAVENOUS EVERY 6 HOURS PRN
Status: DISCONTINUED | OUTPATIENT
Start: 2019-02-01 | End: 2019-02-02 | Stop reason: HOSPADM

## 2019-02-01 RX ORDER — LEVOTHYROXINE SODIUM 0.15 MG/1
150 TABLET ORAL
Status: DISCONTINUED | OUTPATIENT
Start: 2019-02-02 | End: 2019-02-02 | Stop reason: HOSPADM

## 2019-02-01 RX ORDER — NALOXONE HCL 0.4 MG/ML
0.4 VIAL (ML) INJECTION
Status: DISCONTINUED | OUTPATIENT
Start: 2019-02-01 | End: 2019-02-02 | Stop reason: HOSPADM

## 2019-02-01 RX ORDER — HYDROMORPHONE HYDROCHLORIDE 1 MG/ML
0.5 INJECTION, SOLUTION INTRAMUSCULAR; INTRAVENOUS; SUBCUTANEOUS ONCE
Status: COMPLETED | OUTPATIENT
Start: 2019-02-01 | End: 2019-02-01

## 2019-02-01 RX ORDER — SODIUM CHLORIDE 0.9 % (FLUSH) 0.9 %
3-10 SYRINGE (ML) INJECTION AS NEEDED
Status: DISCONTINUED | OUTPATIENT
Start: 2019-02-01 | End: 2019-02-02 | Stop reason: HOSPADM

## 2019-02-01 RX ORDER — DEXTROSE, SODIUM CHLORIDE, AND POTASSIUM CHLORIDE 5; .45; .15 G/100ML; G/100ML; G/100ML
100 INJECTION INTRAVENOUS CONTINUOUS
Status: DISCONTINUED | OUTPATIENT
Start: 2019-02-01 | End: 2019-02-02 | Stop reason: HOSPADM

## 2019-02-01 RX ADMIN — POTASSIUM CHLORIDE, DEXTROSE MONOHYDRATE AND SODIUM CHLORIDE 100 ML/HR: 150; 5; 450 INJECTION, SOLUTION INTRAVENOUS at 21:15

## 2019-02-01 RX ADMIN — SODIUM CHLORIDE, PRESERVATIVE FREE 3 ML: 5 INJECTION INTRAVENOUS at 22:15

## 2019-02-01 RX ADMIN — PROMETHAZINE HYDROCHLORIDE 12.5 MG: 25 INJECTION INTRAMUSCULAR; INTRAVENOUS at 22:11

## 2019-02-01 RX ADMIN — SODIUM CHLORIDE, POTASSIUM CHLORIDE, SODIUM LACTATE AND CALCIUM CHLORIDE 1000 ML: 600; 310; 30; 20 INJECTION, SOLUTION INTRAVENOUS at 15:03

## 2019-02-01 RX ADMIN — HYDROMORPHONE HYDROCHLORIDE 0.5 MG: 1 INJECTION, SOLUTION INTRAMUSCULAR; INTRAVENOUS; SUBCUTANEOUS at 15:03

## 2019-02-01 RX ADMIN — HYDROMORPHONE HYDROCHLORIDE 1 MG: 1 INJECTION, SOLUTION INTRAMUSCULAR; INTRAVENOUS; SUBCUTANEOUS at 16:59

## 2019-02-01 RX ADMIN — ONDANSETRON 4 MG: 2 INJECTION INTRAMUSCULAR; INTRAVENOUS at 15:03

## 2019-02-01 RX ADMIN — IOPAMIDOL 85 ML: 612 INJECTION, SOLUTION INTRAVENOUS at 16:32

## 2019-02-01 RX ADMIN — ONDANSETRON 4 MG: 2 INJECTION INTRAMUSCULAR; INTRAVENOUS at 18:02

## 2019-02-01 RX ADMIN — METRONIDAZOLE 500 MG: 500 INJECTION, SOLUTION INTRAVENOUS at 17:57

## 2019-02-01 RX ADMIN — LEVOFLOXACIN 500 MG: 5 INJECTION, SOLUTION INTRAVENOUS at 22:11

## 2019-02-01 NOTE — ED PROVIDER NOTES
EMERGENCY DEPARTMENT ENCOUNTER    CHIEF COMPLAINT  Chief Complaint: abdominal pain  History given by: patient  History limited by: nothing  Room Number: 18/18  PMD: Luiz Uribe MD      HPI:  Pt is a 48 y.o. female who presents complaining of epigastric abdominal pain that began about two hours ago and woke Pt up from sleep. Her pain fluctuates from stabbing to dull and radiates through to her back. Pt also complains of nausea but denies vomiting. She denies urinary symptoms, SOA, black or tarry stools, recent illness, and fever. She also denies history of blood clots, leg swelling, recent surgery, and recent travel. She has no other complaints at this time.     Duration:  Two hours  Onset: sudden  Timing: constant  Location: epigastric  Radiation: through to her back  Quality: fluctuates between stabbing and dull  Intensity/Severity: moderate  Progression: unchanged  Associated Symptoms: nausea  Aggravating Factors: none  Alleviating Factors: none  Previous Episodes: none  Treatment before arrival: none    PAST MEDICAL HISTORY  Active Ambulatory Problems     Diagnosis Date Noted   • Asthma 08/23/2018   • Environmental and seasonal allergies 08/23/2018   • Morbid obesity (CMS/HCC) 08/23/2018   • Attention deficit hyperactivity disorder (ADHD), combined type 08/23/2018   • Chronic left-sided low back pain 08/23/2018   • Chronic insomnia 08/23/2018   • Hypothyroidism due to Hashimoto's thyroiditis 08/27/2018   • Cold sore 01/15/2019     Resolved Ambulatory Problems     Diagnosis Date Noted   • No Resolved Ambulatory Problems     Past Medical History:   Diagnosis Date   • ADHD (attention deficit hyperactivity disorder)    • Asthma    • Hashimoto's disease    • History of bulimia    • Hypothyroidism    • Insomnia    • Obesity        PAST SURGICAL HISTORY  Past Surgical History:   Procedure Laterality Date   • BREAST AUGMENTATION      twice (1992 & 1998)   • LIPOSUCTION  1992   • UMBILICAL HERNIA REPAIR  1992        FAMILY HISTORY  Family History   Problem Relation Age of Onset   • Kidney disease Mother    • Valvular heart disease Mother    • Alzheimer's disease Father 70   • Bipolar disorder Father    • Alzheimer's disease Paternal Aunt 70   • Bipolar disorder Sister    • Bipolar disorder Brother    • Bipolar disorder Sister    • ADD / ADHD Sister    • Cervical cancer Maternal Grandmother    • Alzheimer's disease Paternal Grandmother    • Colon cancer Neg Hx    • Breast cancer Neg Hx    • Diabetes Neg Hx        SOCIAL HISTORY  Social History     Socioeconomic History   • Marital status:      Spouse name: Anthony   • Number of children: 0   • Years of education: Not on file   • Highest education level: Not on file   Social Needs   • Financial resource strain: Not on file   • Food insecurity - worry: Not on file   • Food insecurity - inability: Not on file   • Transportation needs - medical: Not on file   • Transportation needs - non-medical: Not on file   Occupational History   • Occupation: RN      Comment: Psychiatric nurse (adolescent)   Tobacco Use   • Smoking status: Former Smoker     Years: 5.00     Types: Cigarettes     Last attempt to quit:      Years since quittin.0   • Smokeless tobacco: Never Used   Substance and Sexual Activity   • Alcohol use: No   • Drug use: No   • Sexual activity: Yes     Partners: Male   Other Topics Concern   • Not on file   Social History Narrative   • Not on file       ALLERGIES  Apple; Peanut oil; Penicillins; Codeine; and Epinephrine    REVIEW OF SYSTEMS  Review of Systems   Constitutional: Negative for fever.   HENT: Negative for sore throat.    Eyes: Negative.    Respiratory: Negative for cough and shortness of breath.    Cardiovascular: Negative for chest pain.   Gastrointestinal: Positive for abdominal pain (epigastric) and nausea. Negative for diarrhea and vomiting.   Genitourinary: Negative for dysuria.   Musculoskeletal: Negative for neck pain.   Skin: Negative  for rash.   Neurological: Negative for weakness, numbness and headaches.   Hematological: Negative.    Psychiatric/Behavioral: Negative.    All other systems reviewed and are negative.      PHYSICAL EXAM  ED Triage Vitals   Temp Heart Rate Resp BP SpO2   02/01/19 1437 02/01/19 1437 02/01/19 1437 02/01/19 1446 02/01/19 1437   97.9 °F (36.6 °C) 103 18 148/97 99 %      Temp src Heart Rate Source Patient Position BP Location FiO2 (%)   -- -- -- 02/01/19 1446 --      Left arm        Physical Exam   Constitutional: She is oriented to person, place, and time and well-developed, well-nourished, and in no distress. No distress.   HENT:   Mouth/Throat: Mucous membranes are normal.   Eyes: EOM are normal.   Cardiovascular: Normal rate and regular rhythm. Exam reveals no gallop and no friction rub.   No murmur heard.  Pulmonary/Chest: Effort normal. No respiratory distress. She has no wheezes. She has no rhonchi. She has no rales.   Breath sounds are symmetric.   Abdominal: Soft. She exhibits no distension. There is tenderness (mild) in the right upper quadrant and epigastric area. There is positive Chawla's sign. There is no guarding.   obese   Musculoskeletal: Normal range of motion. She exhibits no deformity.   Neurological: She is alert and oriented to person, place, and time.   moving all extremities, no focal deficits   Skin: Skin is warm and dry.   Psychiatric:   Calm, cooperative       LAB RESULTS  Lab Results (last 24 hours)     Procedure Component Value Units Date/Time    CBC & Differential [[id number]] Collected:  02/01/19 1455    Specimen:  Blood Updated:  02/01/19 1506    Narrative:       The following orders were created for panel order CBC & Differential.  Procedure                               Abnormality         Status                     ---------                               -----------         ------                     CBC Auto Differential[[id number]]        Abnormal            Final result                  Please view results for these tests on the individual orders.    Comprehensive Metabolic Panel [[id number]]  (Abnormal) Collected:  02/01/19 1455    Specimen:  Blood Updated:  02/01/19 1527     Glucose 96 mg/dL      BUN 12 mg/dL      Creatinine 0.93 mg/dL      Sodium 137 mmol/L      Potassium 4.0 mmol/L      Chloride 101 mmol/L      CO2 21.2 mmol/L      Calcium 9.2 mg/dL      Total Protein 7.4 g/dL      Albumin 4.10 g/dL      ALT (SGPT) 17 U/L      AST (SGOT) 17 U/L      Alkaline Phosphatase 63 U/L      Total Bilirubin 0.5 mg/dL      eGFR Non African Amer 65 mL/min/1.73      Globulin 3.3 gm/dL      A/G Ratio 1.2 g/dL      BUN/Creatinine Ratio 12.9     Anion Gap 14.8 mmol/L     Lipase [[id number]]  (Normal) Collected:  02/01/19 1455    Specimen:  Blood Updated:  02/01/19 1527     Lipase 17 U/L     Magnesium [[id number]]  (Normal) Collected:  02/01/19 1455    Specimen:  Blood Updated:  02/01/19 1527     Magnesium 2.1 mg/dL     CBC Auto Differential [[id number]]  (Abnormal) Collected:  02/01/19 1455    Specimen:  Blood Updated:  02/01/19 1506     WBC 11.95 10*3/mm3      RBC 4.60 10*6/mm3      Hemoglobin 14.6 g/dL      Hematocrit 43.7 %      MCV 95.0 fL      MCH 31.7 pg      MCHC 33.4 g/dL      RDW 14.0 %      RDW-SD 48.3 fl      MPV 10.0 fL      Platelets 317 10*3/mm3      Neutrophil % 70.6 %      Lymphocyte % 20.7 %      Monocyte % 7.4 %      Eosinophil % 1.0 %      Basophil % 0.3 %      Immature Grans % 0.2 %      Neutrophils, Absolute 8.45 10*3/mm3      Lymphocytes, Absolute 2.47 10*3/mm3      Monocytes, Absolute 0.88 10*3/mm3      Eosinophils, Absolute 0.12 10*3/mm3      Basophils, Absolute 0.03 10*3/mm3      Immature Grans, Absolute 0.02 10*3/mm3     Urinalysis With Microscopic If Indicated (No Culture) - Urine, Clean Catch [[id number]]  (Abnormal) Collected:  02/01/19 1505    Specimen:  Urine, Clean Catch Updated:  02/01/19 1548     Color, UA Yellow     Appearance, UA Clear     pH, UA 5.5     Specific Gravity, UA  1.021     Glucose, UA Negative     Ketones, UA Negative     Bilirubin, UA Negative     Blood, UA Small (1+)     Protein, UA Negative     Leuk Esterase, UA Negative     Nitrite, UA Negative     Urobilinogen, UA 0.2 E.U./dL    Urinalysis, Microscopic Only - Urine, Clean Catch [[id number]]  (Abnormal) Collected:  02/01/19 1505    Specimen:  Urine, Clean Catch Updated:  02/01/19 1548     RBC, UA 3-5 /HPF      WBC, UA 3-5 /HPF      Bacteria, UA None Seen /HPF      Squamous Epithelial Cells, UA 7-12 /HPF      Hyaline Casts, UA 0-2 /LPF      Methodology Manual Light Microscopy          I ordered the above labs and reviewed the results    RADIOLOGY  CT Abdomen Pelvis With Contrast   Preliminary Result   1. There is likely acute cholecystitis. There is no biliary dilatation.   2. Mild hepatic steatosis.       Discussed with Dr. Mendoza.             Reviewed CT abd/pelvis which shows a distended and edematous gallbladder wall. Independently viewed by me. Interpreted by radiologist. Discussed with Dr. Grande.    I ordered the above noted radiological studies. Interpreted by radiologist. Discussed with radiologist (Dr. Grande). Reviewed by me in PACS.       PROCEDURES  Procedures      PROGRESS AND CONSULTS     1447  Ordered labs and UA for further evaluation. Ordered dilaudid for pain and zofran for nausea.    1454  Ordered CT abd/pelvis for further evaluation. Ordered IV fluids for hydration.    1653  Rechecked Pt who is resting comfortably. Informed Pt that her CT abd/pelvis shows that her gallbladder is inflamed. Discussed plans to consult with surgery  to admit her and remove it. Pt understands and agrees to all plans. All questions answered.     1656  Placed call to surgery for consult.    1724  Discussed Pt's case with Dr. Grant (surgery) who agrees to admit Pt for further evaluation and treatment.    1730  Rechecked Pt who is resting comfortably. Informed Pt that she has been admitted and her gallbladder will likely be  removed first thing tomorrow morning.    MEDICAL DECISION MAKING  Results were reviewed/discussed with the patient and they were also made aware of online access. Pt also made aware that some labs, such as cultures, will not be resulted during ER visit and follow up with PMD is necessary.     Dr. Grant to hospitalize with plans for cholecystectomy tomorrow morning. Given a dose of IV Cipro and IV Flagyl due to penicillin allergy. Patient updated on the course and plan and need for surgery, and is in agreement.    MDM  Number of Diagnoses or Management Options     Amount and/or Complexity of Data Reviewed  Clinical lab tests: ordered and reviewed (Labs and UA are unremarkable.)  Tests in the radiology section of CPT®: ordered and reviewed (CT abd/pelvis which shows an edematous and distended gallbladder wall.)  Discussion of test results with the performing providers: yes (Dr. Grande (radiology))  Discuss the patient with other providers: yes (Dr. Grant (surgery))  Independent visualization of images, tracings, or specimens: yes           DIAGNOSIS  Final diagnoses:   Abdominal pain, unspecified abdominal location   Gallbladder problem   Nausea       DISPOSITION  ADMISSION    Discussed treatment plan and reason for admission with pt/family and admitting physician.  Pt/family voiced understanding of the plan for admission for further testing/treatment as needed.       Latest Documented Vital Signs:  As of 5:28 PM  BP- 148/97 HR- 103 Temp- 97.9 °F (36.6 °C) O2 sat- 99%    --  Documentation assistance provided by jadyn Ortiz for Dr. Farris.  Information recorded by the scrlinae was done at my direction and has been verified and validated by me.     Amira Ortiz  02/01/19 8988       Anthony Farris MD  02/01/19 1612

## 2019-02-01 NOTE — ED NOTES
Pt states that she was sleeping today when she started to have upper abd pain that woke her from sleep. Pain radiates across upper and through to her back. Reports nausea. Denies diarrhea, fever, soLoida Renner RN  02/01/19 8201

## 2019-02-01 NOTE — PROGRESS NOTES
Clinical Pharmacy Services: Medication History    Maria Esther Donahue is a 48 y.o. female presenting to [facility] for   Chief Complaint   Patient presents with   • Abdominal Pain     MIDSTERNAL UPPER ABDOMINAL PAIN RADIATING TO BACK SINCE THIS AM       She  has a past medical history of ADHD (attention deficit hyperactivity disorder), Asthma, Hashimoto's disease, History of bulimia, Hypothyroidism, Insomnia, and Obesity.    Allergies as of 02/01/2019 - Reviewed 02/01/2019   Allergen Reaction Noted   • Apple Hives 05/17/2013   • Peanut oil Anaphylaxis 05/17/2013   • Penicillins Hives and Shortness Of Breath    • Codeine Hives and Nausea And Vomiting    • Epinephrine Nausea And Vomiting, Palpitations, and Photosensitivity        Medication information was obtained from: Patient  Pharmacy and Phone Number: [email or web address] [phone]    Prior to Admission Medications     Prescriptions Last Dose Informant Patient Reported? Taking?    albuterol (VENTOLIN HFA) 108 (90 Base) MCG/ACT inhaler Past Month Self No Yes    Inhale 2 puffs Every 4 (Four) Hours As Needed for Wheezing or Shortness of Air.    Cholecalciferol (VITAMIN D3) 5000 units capsule capsule Past Month Self Yes Yes    Take 5,000 Units by mouth Daily. PATIENT DOES NOT TAKE REGULARLY    levothyroxine (SYNTHROID, LEVOTHROID) 150 MCG tablet 1/31/2019 Self No Yes    Take 1 tablet by mouth Daily.    lisdexamfetamine (VYVANSE) 50 MG capsule 1/31/2019 Self Yes Yes    Take 50 mg by mouth Daily      fluticasone (FLONASE) 50 MCG/ACT nasal spray More than a month Self No No    1 spray into the nostril(s) as directed by provider 2 (Two) Times a Day.    Patient taking differently:  1 spray into the nostril(s) as directed by provider 2 (Two) Times a Day As Needed.            Medication notes: Valtrex removed per patient, no longer taking.    This medication list is complete to the best of my knowledge as of 2/1/2019    Please call if questions.    Florina Arenas, Medication  History Technician  2/1/2019 5:43 PM

## 2019-02-01 NOTE — ED NOTES
Attempted to call report. Nurse unavailable at this time x2          Loida Mazariegos, RN  02/01/19 8705

## 2019-02-02 ENCOUNTER — ANESTHESIA (OUTPATIENT)
Dept: PERIOP | Facility: HOSPITAL | Age: 49
End: 2019-02-02

## 2019-02-02 ENCOUNTER — ANESTHESIA EVENT (OUTPATIENT)
Dept: PERIOP | Facility: HOSPITAL | Age: 49
End: 2019-02-02

## 2019-02-02 VITALS
HEIGHT: 59 IN | TEMPERATURE: 97.9 F | HEART RATE: 78 BPM | BODY MASS INDEX: 56.16 KG/M2 | WEIGHT: 278.6 LBS | SYSTOLIC BLOOD PRESSURE: 155 MMHG | DIASTOLIC BLOOD PRESSURE: 82 MMHG | OXYGEN SATURATION: 95 % | RESPIRATION RATE: 16 BRPM

## 2019-02-02 LAB — B-HCG UR QL: NEGATIVE

## 2019-02-02 PROCEDURE — 47562 LAPAROSCOPIC CHOLECYSTECTOMY: CPT | Performed by: SURGERY

## 2019-02-02 PROCEDURE — 25010000002 LORAZEPAM PER 2 MG: Performed by: SURGERY

## 2019-02-02 PROCEDURE — 25010000002 HYDRALAZINE PER 20 MG: Performed by: NURSE ANESTHETIST, CERTIFIED REGISTERED

## 2019-02-02 PROCEDURE — 25010000002 FENTANYL CITRATE (PF) 100 MCG/2ML SOLUTION: Performed by: NURSE ANESTHETIST, CERTIFIED REGISTERED

## 2019-02-02 PROCEDURE — 25010000002 PROMETHAZINE PER 50 MG: Performed by: SURGERY

## 2019-02-02 PROCEDURE — 47562 LAPAROSCOPIC CHOLECYSTECTOMY: CPT | Performed by: SPECIALIST/TECHNOLOGIST, OTHER

## 2019-02-02 PROCEDURE — 25010000002 KETOROLAC TROMETHAMINE PER 15 MG: Performed by: NURSE ANESTHETIST, CERTIFIED REGISTERED

## 2019-02-02 PROCEDURE — 25010000002 ONDANSETRON PER 1 MG: Performed by: NURSE ANESTHETIST, CERTIFIED REGISTERED

## 2019-02-02 PROCEDURE — G0378 HOSPITAL OBSERVATION PER HR: HCPCS

## 2019-02-02 PROCEDURE — 25010000002 HYDROMORPHONE PER 4 MG: Performed by: NURSE ANESTHETIST, CERTIFIED REGISTERED

## 2019-02-02 PROCEDURE — 96361 HYDRATE IV INFUSION ADD-ON: CPT

## 2019-02-02 PROCEDURE — 81025 URINE PREGNANCY TEST: CPT | Performed by: SURGERY

## 2019-02-02 PROCEDURE — 25010000002 MIDAZOLAM PER 1 MG: Performed by: ANESTHESIOLOGY

## 2019-02-02 PROCEDURE — 88304 TISSUE EXAM BY PATHOLOGIST: CPT | Performed by: SURGERY

## 2019-02-02 PROCEDURE — 25010000002 PROPOFOL 10 MG/ML EMULSION: Performed by: NURSE ANESTHETIST, CERTIFIED REGISTERED

## 2019-02-02 PROCEDURE — 25010000002 ONDANSETRON PER 1 MG: Performed by: SURGERY

## 2019-02-02 PROCEDURE — 96375 TX/PRO/DX INJ NEW DRUG ADDON: CPT

## 2019-02-02 DEVICE — CLIP LIGAT VASC HORIZON TI MD/LG GRN 6CT: Type: IMPLANTABLE DEVICE | Site: ABDOMEN | Status: FUNCTIONAL

## 2019-02-02 RX ORDER — NALOXONE HCL 0.4 MG/ML
0.2 VIAL (ML) INJECTION AS NEEDED
Status: DISCONTINUED | OUTPATIENT
Start: 2019-02-02 | End: 2019-02-02 | Stop reason: HOSPADM

## 2019-02-02 RX ORDER — MAGNESIUM HYDROXIDE 1200 MG/15ML
LIQUID ORAL AS NEEDED
Status: DISCONTINUED | OUTPATIENT
Start: 2019-02-02 | End: 2019-02-02 | Stop reason: HOSPADM

## 2019-02-02 RX ORDER — HYDROCODONE BITARTRATE AND ACETAMINOPHEN 5; 325 MG/1; MG/1
2 TABLET ORAL EVERY 4 HOURS PRN
Status: DISCONTINUED | OUTPATIENT
Start: 2019-02-02 | End: 2019-02-02 | Stop reason: HOSPADM

## 2019-02-02 RX ORDER — ONDANSETRON 2 MG/ML
4 INJECTION INTRAMUSCULAR; INTRAVENOUS ONCE AS NEEDED
Status: DISCONTINUED | OUTPATIENT
Start: 2019-02-02 | End: 2019-02-02 | Stop reason: HOSPADM

## 2019-02-02 RX ORDER — FAMOTIDINE 10 MG/ML
20 INJECTION, SOLUTION INTRAVENOUS ONCE
Status: COMPLETED | OUTPATIENT
Start: 2019-02-02 | End: 2019-02-02

## 2019-02-02 RX ORDER — SODIUM CHLORIDE, SODIUM LACTATE, POTASSIUM CHLORIDE, CALCIUM CHLORIDE 600; 310; 30; 20 MG/100ML; MG/100ML; MG/100ML; MG/100ML
9 INJECTION, SOLUTION INTRAVENOUS CONTINUOUS
Status: DISCONTINUED | OUTPATIENT
Start: 2019-02-02 | End: 2019-02-02

## 2019-02-02 RX ORDER — KETOROLAC TROMETHAMINE 30 MG/ML
INJECTION, SOLUTION INTRAMUSCULAR; INTRAVENOUS AS NEEDED
Status: DISCONTINUED | OUTPATIENT
Start: 2019-02-02 | End: 2019-02-02 | Stop reason: SURG

## 2019-02-02 RX ORDER — FLUMAZENIL 0.1 MG/ML
0.2 INJECTION INTRAVENOUS AS NEEDED
Status: DISCONTINUED | OUTPATIENT
Start: 2019-02-02 | End: 2019-02-02 | Stop reason: HOSPADM

## 2019-02-02 RX ORDER — SODIUM CHLORIDE 9 MG/ML
INJECTION, SOLUTION INTRAVENOUS AS NEEDED
Status: DISCONTINUED | OUTPATIENT
Start: 2019-02-02 | End: 2019-02-02 | Stop reason: HOSPADM

## 2019-02-02 RX ORDER — HYDROCODONE BITARTRATE AND ACETAMINOPHEN 5; 325 MG/1; MG/1
TABLET ORAL
Qty: 30 TABLET | Refills: 0 | Status: SHIPPED | OUTPATIENT
Start: 2019-02-02 | End: 2019-06-27

## 2019-02-02 RX ORDER — MIDAZOLAM HYDROCHLORIDE 1 MG/ML
1 INJECTION INTRAMUSCULAR; INTRAVENOUS
Status: DISCONTINUED | OUTPATIENT
Start: 2019-02-02 | End: 2019-02-02 | Stop reason: HOSPADM

## 2019-02-02 RX ORDER — PROMETHAZINE HYDROCHLORIDE 25 MG/1
25 TABLET ORAL EVERY 6 HOURS PRN
Qty: 15 TABLET | Refills: 0 | Status: SHIPPED | OUTPATIENT
Start: 2019-02-02 | End: 2019-02-09

## 2019-02-02 RX ORDER — HYDRALAZINE HYDROCHLORIDE 20 MG/ML
5 INJECTION INTRAMUSCULAR; INTRAVENOUS
Status: DISCONTINUED | OUTPATIENT
Start: 2019-02-02 | End: 2019-02-02 | Stop reason: HOSPADM

## 2019-02-02 RX ORDER — LORAZEPAM 2 MG/ML
1 INJECTION INTRAMUSCULAR ONCE
Status: COMPLETED | OUTPATIENT
Start: 2019-02-02 | End: 2019-02-02

## 2019-02-02 RX ORDER — SODIUM CHLORIDE 0.9 % (FLUSH) 0.9 %
1-10 SYRINGE (ML) INJECTION AS NEEDED
Status: DISCONTINUED | OUTPATIENT
Start: 2019-02-02 | End: 2019-02-02 | Stop reason: HOSPADM

## 2019-02-02 RX ORDER — FENTANYL CITRATE 50 UG/ML
50 INJECTION, SOLUTION INTRAMUSCULAR; INTRAVENOUS
Status: DISCONTINUED | OUTPATIENT
Start: 2019-02-02 | End: 2019-02-02 | Stop reason: HOSPADM

## 2019-02-02 RX ORDER — OXYCODONE AND ACETAMINOPHEN 7.5; 325 MG/1; MG/1
1 TABLET ORAL ONCE AS NEEDED
Status: DISCONTINUED | OUTPATIENT
Start: 2019-02-02 | End: 2019-02-02 | Stop reason: HOSPADM

## 2019-02-02 RX ORDER — DIPHENHYDRAMINE HYDROCHLORIDE 50 MG/ML
12.5 INJECTION INTRAMUSCULAR; INTRAVENOUS
Status: DISCONTINUED | OUTPATIENT
Start: 2019-02-02 | End: 2019-02-02 | Stop reason: HOSPADM

## 2019-02-02 RX ORDER — BUPIVACAINE HYDROCHLORIDE AND EPINEPHRINE 5; 5 MG/ML; UG/ML
INJECTION, SOLUTION PERINEURAL AS NEEDED
Status: DISCONTINUED | OUTPATIENT
Start: 2019-02-02 | End: 2019-02-02 | Stop reason: HOSPADM

## 2019-02-02 RX ORDER — PROMETHAZINE HYDROCHLORIDE 25 MG/ML
12.5 INJECTION, SOLUTION INTRAMUSCULAR; INTRAVENOUS ONCE AS NEEDED
Status: DISCONTINUED | OUTPATIENT
Start: 2019-02-02 | End: 2019-02-02 | Stop reason: HOSPADM

## 2019-02-02 RX ORDER — PROMETHAZINE HYDROCHLORIDE 25 MG/1
25 SUPPOSITORY RECTAL ONCE AS NEEDED
Status: DISCONTINUED | OUTPATIENT
Start: 2019-02-02 | End: 2019-02-02 | Stop reason: HOSPADM

## 2019-02-02 RX ORDER — HYDROCODONE BITARTRATE AND ACETAMINOPHEN 7.5; 325 MG/1; MG/1
1 TABLET ORAL ONCE AS NEEDED
Status: DISCONTINUED | OUTPATIENT
Start: 2019-02-02 | End: 2019-02-02 | Stop reason: HOSPADM

## 2019-02-02 RX ORDER — HYDROMORPHONE HYDROCHLORIDE 1 MG/ML
0.5 INJECTION, SOLUTION INTRAMUSCULAR; INTRAVENOUS; SUBCUTANEOUS
Status: DISCONTINUED | OUTPATIENT
Start: 2019-02-02 | End: 2019-02-02 | Stop reason: HOSPADM

## 2019-02-02 RX ORDER — PROMETHAZINE HYDROCHLORIDE 25 MG/1
25 TABLET ORAL ONCE AS NEEDED
Status: DISCONTINUED | OUTPATIENT
Start: 2019-02-02 | End: 2019-02-02 | Stop reason: HOSPADM

## 2019-02-02 RX ORDER — ACETAMINOPHEN 325 MG/1
650 TABLET ORAL ONCE AS NEEDED
Status: DISCONTINUED | OUTPATIENT
Start: 2019-02-02 | End: 2019-02-02 | Stop reason: HOSPADM

## 2019-02-02 RX ORDER — ONDANSETRON 2 MG/ML
INJECTION INTRAMUSCULAR; INTRAVENOUS AS NEEDED
Status: DISCONTINUED | OUTPATIENT
Start: 2019-02-02 | End: 2019-02-02 | Stop reason: SURG

## 2019-02-02 RX ORDER — MIDAZOLAM HYDROCHLORIDE 1 MG/ML
2 INJECTION INTRAMUSCULAR; INTRAVENOUS
Status: DISCONTINUED | OUTPATIENT
Start: 2019-02-02 | End: 2019-02-02 | Stop reason: HOSPADM

## 2019-02-02 RX ORDER — LABETALOL HYDROCHLORIDE 5 MG/ML
5 INJECTION, SOLUTION INTRAVENOUS
Status: DISCONTINUED | OUTPATIENT
Start: 2019-02-02 | End: 2019-02-02 | Stop reason: HOSPADM

## 2019-02-02 RX ORDER — ROCURONIUM BROMIDE 10 MG/ML
INJECTION, SOLUTION INTRAVENOUS AS NEEDED
Status: DISCONTINUED | OUTPATIENT
Start: 2019-02-02 | End: 2019-02-02 | Stop reason: SURG

## 2019-02-02 RX ORDER — PROPOFOL 10 MG/ML
VIAL (ML) INTRAVENOUS AS NEEDED
Status: DISCONTINUED | OUTPATIENT
Start: 2019-02-02 | End: 2019-02-02 | Stop reason: SURG

## 2019-02-02 RX ORDER — LIDOCAINE HYDROCHLORIDE 10 MG/ML
0.5 INJECTION, SOLUTION EPIDURAL; INFILTRATION; INTRACAUDAL; PERINEURAL ONCE AS NEEDED
Status: DISCONTINUED | OUTPATIENT
Start: 2019-02-02 | End: 2019-02-02 | Stop reason: HOSPADM

## 2019-02-02 RX ORDER — ONDANSETRON 4 MG/1
4 TABLET, FILM COATED ORAL EVERY 6 HOURS PRN
Qty: 10 TABLET | Refills: 1 | Status: SHIPPED | OUTPATIENT
Start: 2019-02-02 | End: 2019-06-27

## 2019-02-02 RX ORDER — EPHEDRINE SULFATE 50 MG/ML
5 INJECTION, SOLUTION INTRAVENOUS ONCE AS NEEDED
Status: DISCONTINUED | OUTPATIENT
Start: 2019-02-02 | End: 2019-02-02 | Stop reason: HOSPADM

## 2019-02-02 RX ORDER — FENTANYL CITRATE 50 UG/ML
INJECTION, SOLUTION INTRAMUSCULAR; INTRAVENOUS AS NEEDED
Status: DISCONTINUED | OUTPATIENT
Start: 2019-02-02 | End: 2019-02-02 | Stop reason: SURG

## 2019-02-02 RX ORDER — LIDOCAINE HYDROCHLORIDE 20 MG/ML
INJECTION, SOLUTION INFILTRATION; PERINEURAL AS NEEDED
Status: DISCONTINUED | OUTPATIENT
Start: 2019-02-02 | End: 2019-02-02 | Stop reason: SURG

## 2019-02-02 RX ORDER — FENTANYL CITRATE 50 UG/ML
INJECTION, SOLUTION INTRAMUSCULAR; INTRAVENOUS
Status: COMPLETED
Start: 2019-02-02 | End: 2019-02-02

## 2019-02-02 RX ORDER — DIPHENHYDRAMINE HCL 25 MG
25 CAPSULE ORAL
Status: DISCONTINUED | OUTPATIENT
Start: 2019-02-02 | End: 2019-02-02 | Stop reason: HOSPADM

## 2019-02-02 RX ADMIN — HYDROCODONE BITARTRATE AND ACETAMINOPHEN 2 TABLET: 5; 325 TABLET ORAL at 16:27

## 2019-02-02 RX ADMIN — FENTANYL CITRATE 50 MCG: 50 INJECTION, SOLUTION INTRAMUSCULAR; INTRAVENOUS at 11:42

## 2019-02-02 RX ADMIN — LIDOCAINE HYDROCHLORIDE 100 MG: 20 INJECTION, SOLUTION INFILTRATION; PERINEURAL at 10:34

## 2019-02-02 RX ADMIN — MIDAZOLAM HYDROCHLORIDE 2 MG: 2 INJECTION, SOLUTION INTRAMUSCULAR; INTRAVENOUS at 09:38

## 2019-02-02 RX ADMIN — FENTANYL CITRATE 100 MCG: 50 INJECTION, SOLUTION INTRAMUSCULAR; INTRAVENOUS at 10:34

## 2019-02-02 RX ADMIN — HYDROMORPHONE HYDROCHLORIDE 0.5 MG: 1 INJECTION, SOLUTION INTRAMUSCULAR; INTRAVENOUS; SUBCUTANEOUS at 11:53

## 2019-02-02 RX ADMIN — SUGAMMADEX 400 MG: 100 INJECTION, SOLUTION INTRAVENOUS at 11:17

## 2019-02-02 RX ADMIN — HYDRALAZINE HYDROCHLORIDE 5 MG: 20 INJECTION INTRAMUSCULAR; INTRAVENOUS at 12:10

## 2019-02-02 RX ADMIN — ONDANSETRON 4 MG: 2 INJECTION INTRAMUSCULAR; INTRAVENOUS at 11:17

## 2019-02-02 RX ADMIN — FAMOTIDINE 20 MG: 10 INJECTION, SOLUTION INTRAVENOUS at 09:38

## 2019-02-02 RX ADMIN — POTASSIUM CHLORIDE, DEXTROSE MONOHYDRATE AND SODIUM CHLORIDE 100 ML/HR: 150; 5; 450 INJECTION, SOLUTION INTRAVENOUS at 13:08

## 2019-02-02 RX ADMIN — HYDROMORPHONE HYDROCHLORIDE 0.5 MG: 1 INJECTION, SOLUTION INTRAMUSCULAR; INTRAVENOUS; SUBCUTANEOUS at 11:35

## 2019-02-02 RX ADMIN — FENTANYL CITRATE 100 MCG: 50 INJECTION, SOLUTION INTRAMUSCULAR; INTRAVENOUS at 10:41

## 2019-02-02 RX ADMIN — SODIUM CHLORIDE, POTASSIUM CHLORIDE, SODIUM LACTATE AND CALCIUM CHLORIDE 9 ML/HR: 600; 310; 30; 20 INJECTION, SOLUTION INTRAVENOUS at 09:38

## 2019-02-02 RX ADMIN — LORAZEPAM 1 MG: 2 INJECTION INTRAMUSCULAR; INTRAVENOUS at 07:40

## 2019-02-02 RX ADMIN — ROCURONIUM BROMIDE 50 MG: 10 INJECTION INTRAVENOUS at 10:34

## 2019-02-02 RX ADMIN — FENTANYL CITRATE 50 MCG: 50 INJECTION, SOLUTION INTRAMUSCULAR; INTRAVENOUS at 11:03

## 2019-02-02 RX ADMIN — LEVOTHYROXINE SODIUM 150 MCG: 150 TABLET ORAL at 06:17

## 2019-02-02 RX ADMIN — ONDANSETRON 4 MG: 2 INJECTION INTRAMUSCULAR; INTRAVENOUS at 13:08

## 2019-02-02 RX ADMIN — PROMETHAZINE HYDROCHLORIDE 12.5 MG: 25 INJECTION INTRAMUSCULAR; INTRAVENOUS at 14:18

## 2019-02-02 RX ADMIN — KETOROLAC TROMETHAMINE 30 MG: 30 INJECTION, SOLUTION INTRAMUSCULAR; INTRAVENOUS at 11:17

## 2019-02-02 RX ADMIN — HYDRALAZINE HYDROCHLORIDE 5 MG: 20 INJECTION INTRAMUSCULAR; INTRAVENOUS at 12:00

## 2019-02-02 RX ADMIN — PROPOFOL 200 MG: 10 INJECTION, EMULSION INTRAVENOUS at 10:34

## 2019-02-02 RX ADMIN — FENTANYL CITRATE 50 MCG: 50 INJECTION, SOLUTION INTRAMUSCULAR; INTRAVENOUS at 11:32

## 2019-02-02 NOTE — ANESTHESIA PREPROCEDURE EVALUATION
Anesthesia Evaluation     Patient summary reviewed and Nursing notes reviewed   NPO Solid Status: > 8 hours  NPO Liquid Status: > 8 hours           Airway   Mallampati: IV  Small opening, Difficult intubation highly probable and Anterior  Dental      Pulmonary - normal exam    breath sounds clear to auscultation  (+) asthma,   Cardiovascular - negative cardio ROS and normal exam        Neuro/Psych- negative ROS  GI/Hepatic/Renal/Endo    (+) morbid obesity,  hypothyroidism,     Musculoskeletal (-) negative ROS    Abdominal   (+) obese,    Substance History - negative use     OB/GYN          Other - negative ROS                       Anesthesia Plan    ASA 3     general     intravenous induction   Anesthetic plan, all risks, benefits, and alternatives have been provided, discussed and informed consent has been obtained with: patient.

## 2019-02-02 NOTE — ANESTHESIA POSTPROCEDURE EVALUATION
Patient: Maria Esther Donahue    Procedure Summary     Date:  02/02/19 Room / Location:  [facility] OR 04 / [facility] MAIN OR    Anesthesia Start:  1026 Anesthesia Stop:  1128    Procedure:  CHOLECYSTECTOMY LAPAROSCOPIC (N/A Abdomen) Diagnosis:      Surgeon:  Cruz Grant MD Provider:  James Smith MD    Anesthesia Type:  general ASA Status:  3          Anesthesia Type: general  Last vitals  BP   129/71 (02/02/19 1424)   Temp   36.6 °C (97.9 °F) (02/02/19 1215)   Pulse   79 (02/02/19 1424)   Resp   16 (02/02/19 1424)     SpO2   95 % (02/02/19 1424)     Post Anesthesia Care and Evaluation    Patient location during evaluation: PACU  Patient participation: complete - patient participated  Level of consciousness: awake  Pain score: 2  Pain management: adequate  Airway patency: patent  Anesthetic complications: No anesthetic complications  PONV Status: controlled  Cardiovascular status: acceptable  Respiratory status: acceptable  Hydration status: acceptable

## 2019-02-02 NOTE — PLAN OF CARE
Problem: Pain, Acute (Adult)  Goal: Identify Related Risk Factors and Signs and Symptoms  Outcome: Ongoing (interventions implemented as appropriate)    Goal: Acceptable Pain Control/Comfort Level  Outcome: Ongoing (interventions implemented as appropriate)      Problem: Patient Care Overview  Goal: Plan of Care Review  Outcome: Ongoing (interventions implemented as appropriate)   02/02/19 0531   Coping/Psychosocial   Plan of Care Reviewed With patient   Plan of Care Review   Progress no change   OTHER   Outcome Summary vss, c/o pain and nausea medicated, ivfluid started, pt keep npo for surgery in am, up ad feliciano.     Goal: Individualization and Mutuality  Outcome: Ongoing (interventions implemented as appropriate)    Goal: Discharge Needs Assessment  Outcome: Ongoing (interventions implemented as appropriate)    Goal: Interprofessional Rounds/Family Conf  Outcome: Ongoing (interventions implemented as appropriate)

## 2019-02-02 NOTE — ANESTHESIA PROCEDURE NOTES
Airway  Urgency: elective    Date/Time: 2/2/2019 10:52 AM  End Time:2/2/2019 10:52 AM  Airway not difficult    General Information and Staff    Patient location during procedure: OR  Anesthesiologist: James Smith MD  CRNA: Helene Perez CRNA    Indications and Patient Condition  Indications for airway management: airway protection    Preoxygenated: yes  MILS maintained throughout  Mask difficulty assessment: 1 - vent by mask    Final Airway Details  Final airway type: endotracheal airway      Successful airway: ETT  Cuffed: yes   Successful intubation technique: direct laryngoscopy  Facilitating devices/methods: intubating stylet  Endotracheal tube insertion site: oral  Blade: [place]  Blade size: 2  ETT size (mm): 7.0  Cormack-Lehane Classification: grade I - full view of glottis  Placement verified by: chest auscultation and capnometry   Measured from: lips  Number of attempts at approach: 1    Additional Comments  Atraumatic, Secured after verification of placement

## 2019-02-02 NOTE — OP NOTE
PREOPERATIVE DIAGNOSIS:  Acute cholecystitis with cholelithiasis    POSTOPERATIVE DIAGNOSIS (FINDINGS):  Same    PROCEDURE:  Laparoscopic cholecystectomy     SURGEON:  Cruz Grant MD    ASSISTANT:  Erika Myers    ANESTHESIA:  General    EBL:  Minimal    SPECIMEN(S):  Gallbladder    DESCRIPTION:  Supine position. General anesthesia. Prepped and draped, usual sterile manner.    1/2 % marcaine with epinephrine infiltrated in all incision sites. Small periumbilical incision made, Veress needle inserted with upward traction on abdominal wall. Abdomen insufflated to 15 mm Hg pressure and 5 mm opti-view trocar inserted followed by 10 mm subxiphoid and 5 mm right subcostal trocars.    Gallbladder grasped and elevated. Cystic duct dissected and clipped once distally.  I opened the cystic duct which was diminutive in size and could not get the cholangiogram catheter to thread.  I dissected the cystic duct slightly more proximally and again made a small opening and again could not get the cholangiogram catheter inserted.  The anatomy was clear and I proceeded to clip the cystic duct twice proximally and once distally and divided.  The cystic artery was likewise clipped twice proximally once distally and divided.  Gallbladder removed from the liver bed with electrocautery and removed through subxiphoid trocar site. Liver bed copiously irrigated and aspirated with good hemostasis noted. CO2 released, fascia of subxiphoid trocar site closed with 0-vicryl, skin edges 5-0 vicryl subcuticular suture.  Dermabond applied.    Tolerated well.  Stable to PACU.    Cruz Grant M.D.

## 2019-02-02 NOTE — DISCHARGE INSTRUCTIONS
Dr. Cruz Grant  [address]  [place], [place] [address] [phone]    Discharge Instructions for Gall Bladder Surgery      1. Go home, rest and take it easy today; however, you should get up and move about several times today to reduce the risk of developing a clot in your legs.      2. You may experience some dizziness or memory loss from the anesthesia.  This may last for the next 24 hours.  Someone should plan on staying with you for the first 24 hours for your safety.    3. Do not make any important legal decisions or sign any legal papers for the next 24 hours.      4. Eat and drink lightly today.  Start off with liquids, jello, soup, crackers or other bland foods at first. You may advance your diet tomorrow as tolerated as long as you do not experience any nausea or vomiting.     5. If skin glue (Dermabond) was used, your incisions are protected and covered.  The invisible glue will dissolve on its own as your incision heals. If you have dressings, you may remove your outer dressings in 3 days.  The white tapes called steri-strips should stay in place.  They will fall off on their own in 1-2 weeks.  Do not worry if they come off sooner.      6. If you have dressings, you may notice some bleeding/drainage on your outer dressings. A little bloody drainage is normal. If the bleeding/drainage is such that the bandage cannot absorb it, remove the dressing, apply clean gauze and apply firm pressure for a full 15 minutes.  If the bleeding continues, please call me.    7. You may shower tomorrow allowing water to run over the incisions; however, do not scrub the incisions.   No tub baths until your incisions are completely healed.         8. You have received a prescription for a narcotic pain medicine, as you will have some pain following surgery.   You will not be totally pain free, but your pain medicine should make the pain tolerable.  Please take your pain medicine as prescribed and always take  your pills with food to prevent nausea. If you are having severe pain that cannot be controlled by the pain medicine, please contact me.      9. You have also received a prescription for an anti-nausea medicine.  Please take this as prescribed for any nausea or vomiting.  Nausea could be a result of the anesthesia or a result of the narcotic pain medicine.  If you experience severe nausea and vomiting that cannot be controlled by the nausea medicine, please call me.            10. It is not unusual to experience pain/discomfort in your shoulders or under your ribs after surgery.  It is from the gas used during the laparoscopic procedure and usually lasts 1-3 days.  The prescription pain medicine is used to treat the surgical pain and does not typically alleviate this “gassy” pain.     11. No driving for 24 hours and for as long as you are taking your prescription pain medicine.      12. You will need to call the office at [phone] to schedule a follow-up appointment in 6-10 days.     13. Remember to contact me for any of the following:    • Fever > 101 degrees  • Severe pain that cannot be controlled by taking your pain pills  • Severe nausea or vomiting that cannot be controlled by taking your nausea pills  • Significant bleeding of your incisions  • Drainage that has a bad smell or is yellow or green in appearance  • Any other questions or concerns

## 2019-02-02 NOTE — H&P
SUMMARY (A/P):    48-year-old lady with classic presentation for biliary colic and acute cholecystitis.  She understands the options and wishes to proceed with laparoscopic cholecystectomy.  She understands nature of the procedure and the risks including but not limited to bleeding, infection, conversion to open procedure, postoperative bile leak, and the bowel function changes which can accompany cholecystectomy.  She also understands that her risks of surgical intervention are increased secondary to BMI of 56.2.      CC:    Abdominal pain    HPI:    48-year-old lady presents with 1 day history of moderately severe right upper quadrant abdominal pain radiating to the back and associated with nausea and emesis.    PSH:    Umbilical hernia repair 1992  Breast augmentation    PMH:    Hypothyroidism  Morbid obesity  Discussed with patient increased perioperative risks associated with obesity including increased risks of DVT, infection, seromas, poor wound healing and hernias (with abdominal surgery).  ADD    FAMILY HISTORY:    Mother with gallbladder disease  Negative for colorectal cancer    SOCIAL HISTORY:   Denies tobacco use  Occasional alcohol use    ALLERGIES: reviewed, in Epic    MEDICATIONS: reviewed, in Epic    ROS:  No chest pain or shortness of air.  All other systems reviewed and negative other than presenting complaints.    RADIOLOGY/ENDOSCOPY:    CT abdomen pelvis read as likely acute cholecystitis with hepatic steatosis.  On my review the images I concur that the gallbladder is distended with thickened wall consistent with cholecystitis.  No other acute abnormalities noted.    LABS:    Lipase normal  CMP basically normal  WBC 11.95, CBC otherwise normal    PHYSICAL EXAM:   Constitutional: Well-developed well-nourished, no acute distress  Vital signs: /91, HR 66, RR 18, T 97.9, weight 278 pounds, height 59 inches, BMI 56.2  Eyes: Conjunctiva normal, sclera nonicteric  ENMT: Hearing grossly normal,  oral mucosa moist  Neck: Supple, no palpable mass, normal thyroid, trachea midline  Respiratory: Clear to auscultation, normal inspiratory effort  Cardiovascular: Regular rate, no murmur, no carotid bruit, no peripheral edema, no jugular venous distention  Gastrointestinal: Soft, minimally tender in the upper abdomen, no palpable mass, no hepatosplenomegaly, negative for hernia, bowel sounds normal  Lymphatics (palpable nodes):  cervical-negative, axillary-negative  Skin:  Warm, dry, no rash on visualized skin surfaces  Musculoskeletal: Symmetric strength, normal gait  Psychiatric: Alert and oriented ×3, normal affect     LEIGH ANN POLLACK M.D.

## 2019-02-05 LAB
CYTO UR: NORMAL
LAB AP CASE REPORT: NORMAL
PATH REPORT.FINAL DX SPEC: NORMAL
PATH REPORT.GROSS SPEC: NORMAL

## 2019-02-11 DIAGNOSIS — E03.8 HYPOTHYROIDISM DUE TO HASHIMOTO'S THYROIDITIS: Chronic | ICD-10-CM

## 2019-02-11 DIAGNOSIS — E06.3 HYPOTHYROIDISM DUE TO HASHIMOTO'S THYROIDITIS: Chronic | ICD-10-CM

## 2019-02-13 ENCOUNTER — TELEPHONE (OUTPATIENT)
Dept: INTERNAL MEDICINE | Age: 49
End: 2019-02-13

## 2019-02-13 LAB
T4 FREE SERPL-MCNC: 1.17 NG/DL (ref 0.82–1.77)
TSH SERPL DL<=0.005 MIU/L-ACNC: 12.69 UIU/ML (ref 0.45–4.5)

## 2019-02-13 NOTE — TELEPHONE ENCOUNTER
Pt states that she had been taking her thyroid meds religiously 150 mcg in the am. But missed maybe 2 days at the most from her surg. Pt scheduled for recheck in 2 weeks. Per Dr Uribe. YOU

## 2019-02-13 NOTE — PROGRESS NOTES
Call patient.    Thyroid level is still low. She recently had gallbladder surgery therefore probably missed some doses of medication. Verify this with her. I would suggest rechecking TFT in 2 weeks.

## 2019-02-13 NOTE — TELEPHONE ENCOUNTER
----- Message from Luiz Uribe MD sent at 2/13/2019  7:01 AM EST -----  Call patient.    Thyroid level is still low. She recently had gallbladder surgery therefore probably missed some doses of medication. Verify this with her. I would suggest rechecking TFT in 2 weeks.

## 2019-02-21 ENCOUNTER — OFFICE VISIT (OUTPATIENT)
Dept: SURGERY | Facility: CLINIC | Age: 49
End: 2019-02-21

## 2019-02-21 DIAGNOSIS — Z09 FOLLOW UP: Primary | ICD-10-CM

## 2019-02-21 PROCEDURE — 99024 POSTOP FOLLOW-UP VISIT: CPT | Performed by: SURGERY

## 2019-02-22 NOTE — PROGRESS NOTES
Her scopic cholecystectomy 2/2/2019    Pathology chronic cholecystitis    Office visit: She is still having some pain although overall she is doing better.  As the pain is epigastric and radiates to her back of asked her to take a 6-week course of Prilosec 20 mg p.o. daily.  If at the end of 6 weeks her pain has resolved then no further workup will be warranted other than she is due screening colonoscopy based on age.  If she is still having symptoms then we will perform EGD in addition to colonoscopy.

## 2019-02-25 DIAGNOSIS — E03.8 HYPOTHYROIDISM DUE TO HASHIMOTO'S THYROIDITIS: Chronic | ICD-10-CM

## 2019-02-25 DIAGNOSIS — E06.3 HYPOTHYROIDISM DUE TO HASHIMOTO'S THYROIDITIS: Chronic | ICD-10-CM

## 2019-02-26 LAB
T4 FREE SERPL-MCNC: 1.55 NG/DL (ref 0.93–1.7)
TSH SERPL DL<=0.005 MIU/L-ACNC: 4.72 MIU/ML (ref 0.27–4.2)

## 2019-06-21 ENCOUNTER — HOSPITAL ENCOUNTER (EMERGENCY)
Facility: HOSPITAL | Age: 49
Discharge: HOME | End: 2019-06-21
Admitting: EMERGENCY MEDICINE

## 2019-06-21 ENCOUNTER — APPOINTMENT (OUTPATIENT)
Dept: GENERAL RADIOLOGY | Facility: HOSPITAL | Age: 49
End: 2019-06-21

## 2019-06-21 VITALS
HEART RATE: 104 BPM | RESPIRATION RATE: 16 BRPM | TEMPERATURE: 97.5 F | DIASTOLIC BLOOD PRESSURE: 83 MMHG | OXYGEN SATURATION: 97 % | WEIGHT: 260 LBS | HEIGHT: 60 IN | BODY MASS INDEX: 51.04 KG/M2 | SYSTOLIC BLOOD PRESSURE: 144 MMHG

## 2019-06-21 DIAGNOSIS — S93.402A SPRAIN OF LEFT ANKLE, UNSPECIFIED LIGAMENT, INITIAL ENCOUNTER: Primary | ICD-10-CM

## 2019-06-21 PROCEDURE — 73590 X-RAY EXAM OF LOWER LEG: CPT

## 2019-06-21 PROCEDURE — 99283 EMERGENCY DEPT VISIT LOW MDM: CPT

## 2019-06-21 PROCEDURE — 73610 X-RAY EXAM OF ANKLE: CPT

## 2019-06-21 RX ORDER — HYDROCODONE BITARTRATE AND ACETAMINOPHEN 5; 325 MG/1; MG/1
1 TABLET ORAL ONCE AS NEEDED
Status: DISCONTINUED | OUTPATIENT
Start: 2019-06-21 | End: 2019-06-22 | Stop reason: HOSPADM

## 2019-06-21 RX ADMIN — HYDROCODONE BITARTRATE AND ACETAMINOPHEN 1 TABLET: 5; 325 TABLET ORAL at 20:09

## 2019-06-26 NOTE — PROGRESS NOTES
"Maria Esther Donahue / 48 y.o. / female  Encounter Date: 06/27/2019    ASSESSMENT & PLAN:    Problem List Items Addressed This Visit     None      Visit Diagnoses     Sprain of left ankle, unspecified ligament, initial encounter    -  Primary    Relevant Orders    Ambulatory Referral to Orthopedic Surgery        Orders Placed This Encounter   Procedures   • Ambulatory Referral to Orthopedic Surgery     No orders of the defined types were placed in this encounter.      Summary/Discussion:  1. Recommendations for ankle sprain: Initial treatment consists of rest, ice, compression, elevation (RICE), early mobilization, and support orthosis with early weightbearing.  2. Referral to ortho for further evaluation of non-weightbearing status and burning/sharp nerve pain complaint, inferior L foot.     Return if symptoms worsen or fail to improve, for Next scheduled follow up.  ________________________________________________________________    VITALS:    Visit Vitals  /80   Pulse 92   Temp 97.2 °F (36.2 °C) (Temporal)   Ht 152.4 cm (60\")   LMP 06/03/2019   SpO2 99%   BMI 50.78 kg/m²       BP Readings from Last 3 Encounters:   06/27/19 126/80   06/21/19 144/83   02/02/19 155/82     Wt Readings from Last 3 Encounters:   06/21/19 118 kg (260 lb)   02/01/19 126 kg (278 lb 9.6 oz)   01/15/19 127 kg (280 lb)      Body mass index is 50.78 kg/m².    CC: Main reason(s) for today's visit: Ankle Injury (fall last friday )      HPI    Patient is a 48 y.o. female who is here for L ankle sprain follow up and burning/sharp pain.  She is a new patient to me.  Sprain occurred on 6/21/19, she was evaluated and treated at [facility]. She presents today with continued swelling and pain x 6 days. Pain is described as dull and throbbing on L lateral ankle, but burning and sharp/stabbing that 'shoots' down the foot from the great toe to the posterior heel. Sensation intact, flexion/extension/inversion/eversion minimal but improving. Mild " "hypopigmentation of all toes. She reports rest, ice, compression with ACE wraps, immobilization with Aircast, and elevation while she has been home the past 6 days. Ibuprofen 800 mg Q6h as directed, with moderate relief. Rarely using crutches due to instability with crutches, she is sitting in a wheelchair today. She has been completely non-weight bearing since injury.     Per ED note 6/21/19:   \"Patient had x-rays obtained. No evidence of fracture of injury noted. She was given one Norco for pain.  The patient had a Ace wrap and Aircast applied.  Distal motor, sensory and vascular status intact after application. Crutches were provided to the patient. Education was provided to the patient regarding splint care, crutch walking use, as well as safety when climbing stairs, inclines and declines. Instructed on cautions of bearing weight on axilla to avoid neural damage. The patient voices understanding of these precautions as given\"      Patient Care Team:  Luiz Uribe MD as PCP - General (Internal Medicine)  ____________________________________________________________________    REVIEW OF SYSTEMS    Review of Systems   Constitutional: Negative.    Respiratory: Negative.    Cardiovascular: Negative.    Musculoskeletal: Positive for gait problem and joint swelling.       PHYSICAL EXAMINATION    Physical Exam   Constitutional: She is oriented to person, place, and time.   Cardiovascular: Intact distal pulses.   Pulmonary/Chest: Effort normal.   Musculoskeletal: She exhibits edema (L ankle, mild, non-pitting) and tenderness (L ankle, lateral).   Neurological: She is alert and oriented to person, place, and time. No sensory deficit. Coordination abnormal.   Skin: Skin is warm and dry.        Psychiatric: She has a normal mood and affect.   Vitals reviewed.    REVIEWED DATA:    Labs:   Lab Results   Component Value Date     02/01/2019    K 4.0 02/01/2019    AST 17 02/01/2019    ALT 17 02/01/2019    BUN 12 02/01/2019 "    CREATININE 0.93 02/01/2019    CREATININE 0.91 01/10/2019    CREATININE 0.90 08/23/2018    EGFRIFNONA 65 02/01/2019    EGFRIFAFRI 80 01/10/2019       Lab Results   Component Value Date    GLUCOSE 96 02/01/2019    HGBA1C 5.20 01/10/2019    HGBA1C 5.40 08/23/2018       Lab Results   Component Value Date     (H) 01/10/2019     (H) 08/23/2018    HDL 52 01/10/2019    TRIG 78 01/10/2019    CHOLHDLRATIO 3.46 01/10/2019       Lab Results   Component Value Date    TSH 4.720 (H) 02/26/2019    FREET4 1.55 02/26/2019          Lab Results   Component Value Date    WBC 11.95 (H) 02/01/2019    HGB 14.6 02/01/2019    HGB 13.7 01/10/2019    HGB 13.6 08/23/2018     02/01/2019         Imaging:      Medical Tests:      Summary of old records / correspondence / consultant report:      Request outside records:   ______________________________________________________________________    ALLERGIES  Allergies   Allergen Reactions   • Peanut Oil Anaphylaxis, Itching, Nausea And Vomiting and Rash   • Penicillins Hives and Shortness Of Breath   • Codeine Nausea And Vomiting and Rash   • Epinephrine Photosensitivity, Nausea And Vomiting, Palpitations and Other (See Comments)     Hypertension          MEDICATIONS  Current Outpatient Medications on File Prior to Visit   Medication Sig   • albuterol (VENTOLIN HFA) 108 (90 Base) MCG/ACT inhaler Inhale 2 puffs Every 4 (Four) Hours As Needed for Wheezing or Shortness of Air.   • fluticasone (FLONASE) 50 MCG/ACT nasal spray 1 spray into the nostril(s) as directed by provider 2 (Two) Times a Day. (Patient taking differently: 1 spray into the nostril(s) as directed by provider 2 (Two) Times a Day As Needed.)   • levothyroxine (SYNTHROID, LEVOTHROID) 150 MCG tablet Take 1 tablet by mouth Daily.   • lisdexamfetamine (VYVANSE) 50 MG capsule Take 50 mg by mouth Daily     • [DISCONTINUED] HYDROcodone-acetaminophen (NORCO) 5-325 MG per tablet 1-2 by mouth every four hours as needed for  pain   • [DISCONTINUED] ondansetron (ZOFRAN) 4 MG tablet Take 1 tablet by mouth Every 6 (Six) Hours As Needed for Nausea or Vomiting for up to 10 doses.     No current facility-administered medications on file prior to visit.        PFSH:     The following portions of the patient's history were reviewed and updated as appropriate: Allergies / Current Medications / Past Medical History / Surgical History / Social History / Family History    PROBLEM LIST   Patient Active Problem List   Diagnosis   • Asthma   • Environmental and seasonal allergies   • Morbid obesity (CMS/HCC)   • Attention deficit hyperactivity disorder (ADHD), combined type   • Chronic left-sided low back pain   • Chronic insomnia   • Hypothyroidism due to Hashimoto's thyroiditis   • Cold sore   • Abdominal pain       PAST MEDICAL HISTORY  Past Medical History:   Diagnosis Date   • ADHD (attention deficit hyperactivity disorder)    • Anxiety    • Arthritis    • Asthma    • Hashimoto's disease    • History of bulimia     teenage years/young adult   • Hypothyroidism    • Insomnia    • Obesity        SURGICAL HISTORY  Past Surgical History:   Procedure Laterality Date   • BREAST AUGMENTATION Bilateral 1992, 1998    Dr. Eng   • CHOLECYSTECTOMY WITH INTRAOPERATIVE CHOLANGIOGRAM N/A 2/2/2019    Procedure: CHOLECYSTECTOMY LAPAROSCOPIC;  Surgeon: Cruz Grant MD;  Location: [facility];  Service: General   • LIPOSUCTION N/A 1992   • UMBILICAL HERNIA REPAIR N/A 1992    Dr. Pérez       SOCIAL HISTORY  Social History     Socioeconomic History   • Marital status:      Spouse name: Paco*   • Number of children: 0   • Years of education: Not on file   • Highest education level: Not on file   Occupational History   • Occupation: RN      Comment: Psychiatric nurse (adolescent)   Tobacco Use   • Smoking status: Former Smoker     Packs/day: 0.20     Years: 4.00     Pack years: 0.80     Types: Cigarettes     Last attempt to quit: 2014     Years  since quittin.4   • Smokeless tobacco: Never Used   Substance and Sexual Activity   • Alcohol use: Yes     Comment: rarely, 2 glasses yearly   • Drug use: No   • Sexual activity: Yes     Partners: Male   Lifestyle   • Physical activity:     Days per week: 0 days     Minutes per session: Not on file   • Stress: Not on file       FAMILY HISTORY  Family History   Problem Relation Age of Onset   • Kidney disease Mother    • Valvular heart disease Mother    • Skin cancer Mother    • Alzheimer's disease Father 70   • Bipolar disorder Father    • Skin cancer Father    • Alzheimer's disease Paternal Aunt 70   • Bipolar disorder Sister    • Bipolar disorder Brother    • Skin cancer Brother    • Bipolar disorder Sister    • ADD / ADHD Sister    • Cervical cancer Maternal Grandmother    • Alzheimer's disease Paternal Grandmother    • Colon cancer Neg Hx    • Breast cancer Neg Hx    • Diabetes Neg Hx

## 2019-06-27 ENCOUNTER — OFFICE VISIT (OUTPATIENT)
Dept: INTERNAL MEDICINE | Age: 49
End: 2019-06-27

## 2019-06-27 ENCOUNTER — TELEPHONE (OUTPATIENT)
Dept: ORTHOPEDIC SURGERY | Facility: CLINIC | Age: 49
End: 2019-06-27

## 2019-06-27 VITALS
OXYGEN SATURATION: 99 % | DIASTOLIC BLOOD PRESSURE: 80 MMHG | SYSTOLIC BLOOD PRESSURE: 126 MMHG | HEIGHT: 60 IN | HEART RATE: 92 BPM | BODY MASS INDEX: 50.78 KG/M2 | TEMPERATURE: 97.2 F

## 2019-06-27 DIAGNOSIS — S93.402A SPRAIN OF LEFT ANKLE, UNSPECIFIED LIGAMENT, INITIAL ENCOUNTER: Primary | ICD-10-CM

## 2019-06-27 PROCEDURE — 99213 OFFICE O/P EST LOW 20 MIN: CPT | Performed by: NURSE PRACTITIONER

## 2019-07-03 ENCOUNTER — OFFICE VISIT (OUTPATIENT)
Dept: SPORTS MEDICINE | Facility: CLINIC | Age: 49
End: 2019-07-03

## 2019-07-03 VITALS
DIASTOLIC BLOOD PRESSURE: 80 MMHG | HEART RATE: 102 BPM | WEIGHT: 260 LBS | OXYGEN SATURATION: 97 % | BODY MASS INDEX: 51.04 KG/M2 | SYSTOLIC BLOOD PRESSURE: 144 MMHG | HEIGHT: 60 IN

## 2019-07-03 DIAGNOSIS — R20.2 PARESTHESIA OF LEFT FOOT: ICD-10-CM

## 2019-07-03 DIAGNOSIS — S99.922A INJURY OF LEFT FOOT, INITIAL ENCOUNTER: Primary | ICD-10-CM

## 2019-07-03 PROCEDURE — 73630 X-RAY EXAM OF FOOT: CPT | Performed by: FAMILY MEDICINE

## 2019-07-03 PROCEDURE — 99204 OFFICE O/P NEW MOD 45 MIN: CPT | Performed by: FAMILY MEDICINE

## 2019-07-03 RX ORDER — PREDNISONE 10 MG/1
TABLET ORAL
Qty: 30 TABLET | Refills: 0 | Status: SHIPPED | OUTPATIENT
Start: 2019-07-03 | End: 2019-07-16

## 2019-07-03 NOTE — PROGRESS NOTES
"Maria Esther is a 48 y.o. year old female evaluation of a problem that is new to this examiner.    Chief Complaint   Patient presents with   • Ankle Injury     left ankle; states that she fell over a gate for her cat.       History of Present Illness   HPI   On 6/21/2019 patient was at home and suffered a fall.  She is not really sure what caused her to fall.  She does recall possibly trying to cross over an 18 inch tall gait that was there to ramirez her cats.  She does not recall hitting her leg on the gait or mis-stepping, \"the next thing I knew I was just on the floor in my left leg hurt.  Patient went to urgent care center in [place], I have reviewed those records and x-ray films and in summary there were no acute findings on the left tib-fib x-ray or the left ankle x-ray.  Patient was diagnosed with a ankle sprain and placed in a air stirrup, Ace wrap and crutches.  Patient states that her pain has continued and her pain now is located mostly over the dorsal midfoot.  Patient also having some \"shooting electrical shocks \"sensations that run down the posterior leg into the bottom of the foot and the bottom part of the first big toe.  The pain is consistent no matter if she is at rest or with walking, it is worse with weightbearing and she does get some relief if she sits with her left leg propped up.  She has not noted any swelling or skin changes.  She denies any coolness to the limb.      I have reviewed the patient's medical, family, and social history in detail and updated the computerized patient record.    Review of Systems   Constitutional: Negative for fever.   Musculoskeletal:        Per HPI   Skin: Negative for wound.   Neurological: Negative for numbness.   All other systems reviewed and are negative.      /80 (BP Location: Left arm, Patient Position: Sitting, Cuff Size: Large Adult)   Pulse 102   Ht 152.4 cm (60\")   Wt 118 kg (260 lb)   LMP 06/03/2019   SpO2 97%   BMI 50.78 kg/m²      Physical Exam "   Constitutional: She is oriented to person, place, and time. She appears well-developed and well-nourished.   HENT:   Head: Normocephalic and atraumatic.   Eyes: Conjunctivae and EOM are normal. Pupils are equal, round, and reactive to light.   Cardiovascular:   No peripheral edema   Pulmonary/Chest: Effort normal.   Musculoskeletal:   Patient sitting in exam chair in no acute distress, has the left limb propped up in a chair.  She ambulated in on crutches.  The Ace wrap and air stirrup were removed, skin is intact, good peripheral pulses and capillary refill. Not cold. No edema.  Exam is very difficult due to the amount of pain she is having even to light touch on every aspect from the left knee down.  Most of her pain does appear to be around the dorsal midfoot medially.  But again exam is difficult because of the amount of pain and pain reaction she has to even light touch.  She is able to flex and extend the ankle   Neurological: She is alert and oriented to person, place, and time.   Skin: Skin is warm and dry.   Psychiatric: She has a normal mood and affect. Her behavior is normal.   Vitals reviewed.  Left Foot X-Ray  Indication: Pain  AP, Lateral, and Oblique views    Findings:  Lisfranc's joint appears normal.  There is some evidence of arthritis in the first MTP and small calcaneal plantar surface heel spur.  No acute fracture seen.  No prior studies were available for comparison.        Current Outpatient Medications:   •  albuterol (VENTOLIN HFA) 108 (90 Base) MCG/ACT inhaler, Inhale 2 puffs Every 4 (Four) Hours As Needed for Wheezing or Shortness of Air., Disp: 1 inhaler, Rfl: 2  •  fluticasone (FLONASE) 50 MCG/ACT nasal spray, 1 spray into the nostril(s) as directed by provider 2 (Two) Times a Day. (Patient taking differently: 1 spray into the nostril(s) as directed by provider 2 (Two) Times a Day As Needed.), Disp: 1 bottle, Rfl: 5  •  levothyroxine (SYNTHROID, LEVOTHROID) 150 MCG tablet, Take 1  tablet by mouth Daily., Disp: 30 tablet, Rfl: 3  •  lisdexamfetamine (VYVANSE) 50 MG capsule, Take 50 mg by mouth Daily  , Disp: , Rfl:   •  predniSONE (DELTASONE) 10 MG tablet, 4 tabs qd x 3 d, then 3 tabs qd x 3 d, then 2 tabs qd x 3 d, then 1 tab qd  x 3 d, Disp: 30 tablet, Rfl: 0     Diagnoses and all orders for this visit:    Injury of left foot, initial encounter  -     XR Foot 3+ View Left  -     predniSONE (DELTASONE) 10 MG tablet; 4 tabs qd x 3 d, then 3 tabs qd x 3 d, then 2 tabs qd x 3 d, then 1 tab qd  x 3 d    Paresthesia of left foot       Most of her pain appears to be nerve related but without consistency of compartment syndrome or radicular dermatomal pain.  Will treat with tapering prednisone but if she is not seeing significant improvement over the next 4 to 5 days then refer to foot Ortho.  Have given her a work note to return on 7/8/2019    EMR Dragon/Transcription disclaimer:    Much of this encounter note is an electronic transcription/translation of spoken language to printed text.  The electronic translation of spoken language may permit erroneous, or at times, nonsensical words or phrases to be inadvertently transcribed.  Although I have reviewed the note for such errors some may still exist.

## 2019-07-08 ENCOUNTER — TELEPHONE (OUTPATIENT)
Dept: SPORTS MEDICINE | Facility: CLINIC | Age: 49
End: 2019-07-08

## 2019-07-08 DIAGNOSIS — R20.2 PARESTHESIA OF LEFT FOOT: ICD-10-CM

## 2019-07-08 DIAGNOSIS — S99.922A INJURY OF LEFT FOOT, INITIAL ENCOUNTER: Primary | ICD-10-CM

## 2019-07-08 NOTE — TELEPHONE ENCOUNTER
Patient called and states that her foot is not any better and that she wants to get that referral in for the foot specialist. Please advise, thank you!

## 2019-07-16 ENCOUNTER — OFFICE VISIT (OUTPATIENT)
Dept: INTERNAL MEDICINE | Age: 49
End: 2019-07-16

## 2019-07-16 VITALS
HEIGHT: 60 IN | SYSTOLIC BLOOD PRESSURE: 140 MMHG | HEART RATE: 98 BPM | RESPIRATION RATE: 18 BRPM | TEMPERATURE: 97.9 F | DIASTOLIC BLOOD PRESSURE: 74 MMHG | BODY MASS INDEX: 50.78 KG/M2 | OXYGEN SATURATION: 100 %

## 2019-07-16 DIAGNOSIS — E03.8 HYPOTHYROIDISM DUE TO HASHIMOTO'S THYROIDITIS: Primary | Chronic | ICD-10-CM

## 2019-07-16 DIAGNOSIS — E66.01 MORBID OBESITY (HCC): Chronic | ICD-10-CM

## 2019-07-16 DIAGNOSIS — S93.402D SPRAIN OF LEFT ANKLE, UNSPECIFIED LIGAMENT, SUBSEQUENT ENCOUNTER: ICD-10-CM

## 2019-07-16 DIAGNOSIS — E06.3 HYPOTHYROIDISM DUE TO HASHIMOTO'S THYROIDITIS: Primary | Chronic | ICD-10-CM

## 2019-07-16 PROBLEM — S93.402A SPRAIN OF LEFT ANKLE: Status: ACTIVE | Noted: 2019-07-16

## 2019-07-16 LAB
T4 FREE SERPL-MCNC: 1.24 NG/DL (ref 0.93–1.7)
TSH SERPL DL<=0.005 MIU/L-ACNC: 7.38 MIU/ML (ref 0.27–4.2)

## 2019-07-16 PROCEDURE — 99214 OFFICE O/P EST MOD 30 MIN: CPT | Performed by: INTERNAL MEDICINE

## 2019-07-16 NOTE — PROGRESS NOTES
Call patient with her test result(s) and mail the results to her if MyChart is NOT active.    Increase Synthroid to 175 mcg qAM (verify on 150 mcg). MAKE SURE SHE RECEIVES NAME BRAND SYNTHROID.   Recheck TSH and Free T4 in 2 months.    (REMINDER: Update med list, maintain dx association, and update change on CURRENT ASSESSMENT/PLAN)

## 2019-07-16 NOTE — PROGRESS NOTES
[facility] INTERNAL MEDICINE  SHARRI FARRIS M.D.      Maria Esther FLORENCE Godfrey / 48 y.o. / female  07/16/2019    ASSESSMENT & PLAN     Problem List Items Addressed This Visit        High    Hypothyroidism due to Hashimoto's thyroiditis - Primary (Chronic)    Current Assessment & Plan     Check labs today.          Relevant Medications    levothyroxine (SYNTHROID, LEVOTHROID) 150 MCG tablet    Other Relevant Orders    TSH+Free T4       Medium    Morbid obesity (CMS/HCC) (Chronic)    Current Assessment & Plan     Maintain a low sugar/starch/carbohydrate diet and exercise regularly. Wt loss advised.    BMI Readings from Last 3 Encounters:   07/16/19 50.78 kg/m²   07/03/19 50.78 kg/m²   06/27/19 50.78 kg/m²      Wt Readings from Last 3 Encounters:   07/03/19 118 kg (260 lb)   06/21/19 118 kg (260 lb)   02/01/19 126 kg (278 lb 9.6 oz)             Sprain of left ankle    Current Assessment & Plan     Phone number for Dr. Champion provided. Follow-up with Dr. Collier as needed.   FMLA form completed today.              Orders Placed This Encounter   Procedures   • TSH+Free T4     No orders of the defined types were placed in this encounter.      Summary/Discussion:  ·     Next Appointment with me: Visit date not found    Return in about 6 months (around 1/16/2020) for Hypothyroidism.      MEDICATIONS  Current Outpatient Medications   Medication Sig Dispense Refill   • albuterol (VENTOLIN HFA) 108 (90 Base) MCG/ACT inhaler Inhale 2 puffs Every 4 (Four) Hours As Needed for Wheezing or Shortness of Air. 1 inhaler 2   • fluticasone (FLONASE) 50 MCG/ACT nasal spray 1 spray into the nostril(s) as directed by provider 2 (Two) Times a Day. (Patient taking differently: 1 spray into the nostril(s) as directed by provider 2 (Two) Times a Day As Needed.) 1 bottle 5   • levothyroxine (SYNTHROID, LEVOTHROID) 150 MCG tablet Take 1 tablet by mouth Daily. 30 tablet 3   • lisdexamfetamine (VYVANSE) 50 MG capsule Take 50 mg by mouth Daily         No current  "facility-administered medications for this visit.           VITALS:    Visit Vitals  /74 (BP Location: Left arm, Patient Position: Sitting, Cuff Size: Large Adult)   Pulse 98   Temp 97.9 °F (36.6 °C) (Oral)   Resp 18   Ht 152.4 cm (60\")   LMP 07/08/2019   SpO2 100%   Breastfeeding? No   BMI 50.78 kg/m²       BP Readings from Last 3 Encounters:   07/16/19 140/74   07/03/19 144/80   06/27/19 126/80     Wt Readings from Last 3 Encounters:   07/03/19 118 kg (260 lb)   06/21/19 118 kg (260 lb)   02/01/19 126 kg (278 lb 9.6 oz)      Body mass index is 50.78 kg/m².        CC:  Main reason(s) for today's visit: Hypothyroidism due to Hashimoto's thyroiditis (6 month follow up )      HPI:     Chronic hypothyroidism:  Previously increased dose of levothyroxine, no changes in physical symptoms  Lab Results   Component Value Date    TSH 4.720 (H) 02/26/2019    TSH 12.690 (H) 02/12/2019    FREET4 1.55 02/26/2019    FREET4 1.17 02/12/2019     Morbid obesity: Weight has not changed significantly since last visit.  Weight loss efforts: physical activity is limited due to underlying medical/physical condition.  Current Body mass index is 50.78 kg/m²..   Wt Readings from Last 3 Encounters:   07/03/19 118 kg (260 lb)   06/21/19 118 kg (260 lb)   02/01/19 126 kg (278 lb 9.6 oz)      Sprained left ankle and was seen by Jocelynn, referred to Dr. Collier, referred to Dr. Champion but has not heard from anyone re: the appointment. Complains of ongoing left foot/ankle pain. Uses crutches.     Patient Care Team:  Luiz Uribe MD as PCP - General (Internal Medicine)      REVIEW OF SYSTEMS     Review of Systems  Constitutional neg  Resp neg  CV neg  GI neg   MuSk left foot / ankle pain      PHYSICAL EXAMINATION     Physical Exam  Constitutional  No distress, morbidly obese   MuSk sits on chair with left leg elevated  Psychiatric  Alert. Judgment and thought content normal. Mood normal      REVIEWED DATA     Labs:     Lab Results   Component " Value Date     02/01/2019    K 4.0 02/01/2019    CALCIUM 9.2 02/01/2019    AST 17 02/01/2019    ALT 17 02/01/2019    BUN 12 02/01/2019    CREATININE 0.93 02/01/2019    CREATININE 0.91 01/10/2019    CREATININE 0.90 08/23/2018    EGFRIFNONA 65 02/01/2019    EGFRIFAFRI 80 01/10/2019       Lab Results   Component Value Date    HGBA1C 5.20 01/10/2019    HGBA1C 5.40 08/23/2018    GLU 94 01/10/2019    GLU 95 08/23/2018       Lab Results   Component Value Date     (H) 01/10/2019     (H) 08/23/2018    HDL 52 01/10/2019    HDL 50 08/23/2018    TRIG 78 01/10/2019    TRIG 111 08/23/2018    CHOLHDLRATIO 3.46 01/10/2019    CHOLHDLRATIO 4.04 08/23/2018       Lab Results   Component Value Date    TSH 4.720 (H) 02/26/2019    FREET4 1.55 02/26/2019       Lab Results   Component Value Date    WBC 11.95 (H) 02/01/2019    HGB 14.6 02/01/2019    HGB 13.7 01/10/2019    HGB 13.6 08/23/2018     02/01/2019       Lab Results   Component Value Date    PROTEIN Negative 01/10/2019    GLUCOSEU Negative 02/01/2019    BLOODU Small (1+) (A) 02/01/2019    NITRITEU Negative 02/01/2019    LEUKOCYTESUR Negative 02/01/2019       Imaging:         Medical Tests:         Summary of old records / correspondence / consultant report:         Request outside records:         ALLERGIES  Allergies   Allergen Reactions   • Peanut Oil Anaphylaxis, Itching, Nausea And Vomiting and Rash   • Penicillins Hives and Shortness Of Breath   • Codeine Nausea And Vomiting and Rash   • Epinephrine Photosensitivity, Nausea And Vomiting, Palpitations and Other (See Comments)     Hypertension          PFSH:     The following portions of the patient's history were reviewed and updated as appropriate: Allergies / Current Medications / Past Medical History / Surgical History / Social History / Family History    PROBLEM LIST   Patient Active Problem List   Diagnosis   • Asthma   • Environmental and seasonal allergies   • Morbid obesity (CMS/HCC)   •  Attention deficit hyperactivity disorder (ADHD), combined type   • Chronic left-sided low back pain   • Chronic insomnia   • Hypothyroidism due to Hashimoto's thyroiditis   • Sprain of left ankle       PAST MEDICAL HISTORY  Past Medical History:   Diagnosis Date   • ADHD (attention deficit hyperactivity disorder)    • Allergic 1981    PCN,CODEINE; MULTIPLE Environmental and food   • Ankle sprain 6/21/2019   • Anxiety    • Arthritis    • Asthma    • Brain concussion     Multi last 2016   • Cholelithiasis 2/1/2019    Gallbladder removed 2/2/2019   • Foot sprain     Multi bilateral   • Fracture of wrist 1981    Left arm   • Hashimoto's disease    • Heart murmur 1984    Pulmonary ejection murmur   • Hernia 1992    Umbilical-SX repaired   • History of bulimia     teenage years/young adult   • HL (hearing loss) 2013    Undiagnosed   • Hypothyroidism    • Insomnia    • Irritable bowel syndrome 1992    Not being treated   • Low back pain 8/2016    Multiple falls   • Obesity    • Seizures (CMS/HCC) 1984    Dx petit mal; never medicated   • Urinary tract infection 1994    Multiple over years   • Visual impairment 1973       SURGICAL HISTORY  Past Surgical History:   Procedure Laterality Date   • BREAST AUGMENTATION Bilateral 1992, 1998    Dr. Eng   • BREAST SURGERY  1992,1998    Implants; implants replaced and lift   • CHOLECYSTECTOMY  2/2/2019   • CHOLECYSTECTOMY WITH INTRAOPERATIVE CHOLANGIOGRAM N/A 2/2/2019    Procedure: CHOLECYSTECTOMY LAPAROSCOPIC;  Surgeon: Cruz Grant MD;  Location: [facility];  Service: General   • COSMETIC SURGERY  1992;1998    Liposuction...bilateral thighs, hips, buttocks 8   • LIPOSUCTION N/A 1992   • UMBILICAL HERNIA REPAIR N/A 1992    Dr. Pérez       SOCIAL HISTORY  Social History     Socioeconomic History   • Marital status:      Spouse name: Paco*   • Number of children: 0   • Years of education: Not on file   • Highest education level: Not on file   Occupational History    • Occupation: RN      Comment: Psychiatric nurse (adolescent)   Tobacco Use   • Smoking status: Former Smoker     Packs/day: 0.25     Years: 5.00     Pack years: 1.25     Types: Cigarettes     Start date: 2009     Last attempt to quit: 2014     Years since quittin.5   • Smokeless tobacco: Never Used   Substance and Sexual Activity   • Alcohol use: Yes     Comment: rarely, 2 glasses yearly   • Drug use: No   • Sexual activity: Yes     Partners: Male     Birth control/protection: Coitus interruptus   Lifestyle   • Physical activity:     Days per week: 0 days     Minutes per session: Not on file   • Stress: Not on file       FAMILY HISTORY  Family History   Problem Relation Age of Onset   • Kidney disease Mother    • Valvular heart disease Mother    • Skin cancer Mother    • Arthritis Mother    • Cancer Mother         Skin   • Hearing loss Mother    • Heart disease Mother    • Hyperlipidemia Mother    • Osteoporosis Mother    • Alzheimer's disease Father 70   • Bipolar disorder Father    • Skin cancer Father    • Cancer Father         Skin   • Alzheimer's disease Paternal Aunt 70   • Bipolar disorder Sister    • Bipolar disorder Brother    • Skin cancer Brother    • Bipolar disorder Sister    • ADD / ADHD Sister    • Cervical cancer Maternal Grandmother    • Cancer Maternal Grandmother         Cervical cancer   • Alzheimer's disease Paternal Grandmother    • Cancer Sister         Skin   • Cancer Sister         Skin   • Cancer Brother         Skin   • Stroke Maternal Grandfather         Stroke 1937   • Colon cancer Neg Hx    • Breast cancer Neg Hx    • Diabetes Neg Hx          **Dragon Disclaimer:   Much of this encounter note is an electronic transcription/translation of spoken language to printed text. The electronic translation of spoken language may permit erroneous, or at times, nonsensical words or phrases to be inadvertently transcribed. Although I have reviewed the note for such errors, some may still  exist.       Template created by Sabiha Uribe MD

## 2019-07-16 NOTE — ASSESSMENT & PLAN NOTE
Phone number for Dr. Champion provided. Follow-up with Dr. Collier as needed.   FMLA form completed today.

## 2019-07-16 NOTE — ASSESSMENT & PLAN NOTE
Maintain a low sugar/starch/carbohydrate diet and exercise regularly. Wt loss advised.    BMI Readings from Last 3 Encounters:   07/16/19 50.78 kg/m²   07/03/19 50.78 kg/m²   06/27/19 50.78 kg/m²      Wt Readings from Last 3 Encounters:   07/03/19 118 kg (260 lb)   06/21/19 118 kg (260 lb)   02/01/19 126 kg (278 lb 9.6 oz)

## 2019-07-18 DIAGNOSIS — E03.9 ACQUIRED HYPOTHYROIDISM: Primary | ICD-10-CM

## 2019-07-18 RX ORDER — LEVOTHYROXINE SODIUM 175 UG/1
175 TABLET ORAL DAILY
Qty: 30 TABLET | Refills: 3 | Status: SHIPPED | OUTPATIENT
Start: 2019-07-18 | End: 2019-07-18

## 2019-07-18 RX ORDER — LEVOTHYROXINE SODIUM 175 UG/1
175 TABLET ORAL DAILY
Qty: 30 TABLET | Refills: 3 | Status: SHIPPED | OUTPATIENT
Start: 2019-07-18 | End: 2022-03-31

## 2019-07-24 ENCOUNTER — TELEPHONE (OUTPATIENT)
Dept: SPORTS MEDICINE | Facility: CLINIC | Age: 49
End: 2019-07-24

## 2019-07-24 NOTE — TELEPHONE ENCOUNTER
Pt called requesting copy of her foot xray and the report for ortho surgery. Pt stats ortho never received referral re faxed referral as well as put a hard copy in with patient  envelop with xray image disk and report

## 2019-08-21 ENCOUNTER — TELEPHONE (OUTPATIENT)
Dept: INTERNAL MEDICINE | Age: 49
End: 2019-08-21

## 2019-08-21 ENCOUNTER — TELEPHONE (OUTPATIENT)
Dept: SPORTS MEDICINE | Facility: CLINIC | Age: 49
End: 2019-08-21

## 2019-08-21 NOTE — TELEPHONE ENCOUNTER
AGATA Snyder w/ [employer] called to ask if the patient had shown any decreased range of motion on the OV on 07.16.19? The note for that OV does not specify anything about patient's range of motion, etc.     Please advise?

## 2019-08-21 NOTE — TELEPHONE ENCOUNTER
Lillian called RN from [place]; in regards to the paperwork for her FMLA. Wanted to know if she followed up with , and wanted to know what the outcome was, if she had limited Range of Motion. She wanted me to give her a call back at [phone].        I called her and LVM stating that the patient is suppose to follow up with ortho surgeon .

## 2019-08-21 NOTE — TELEPHONE ENCOUNTER
"Per OV note: \"Sensation intact, flexion/extension/inversion/eversion minimal but improving.\" Therefore, decreased ROM on exam. Checked for ankle flexion, extension, inversion and eversion ROM and found to be decreased in all areas.   "

## 2019-08-21 NOTE — TELEPHONE ENCOUNTER
Was initially seen by Jocelynn. She saw me in July for unrelated problem.   Jocelynn did not document any ROM findings.

## 2019-08-22 NOTE — TELEPHONE ENCOUNTER
AGATA Snyder, called back re: office visit notes. I advised AGATA Snyder, that she was evaluated for L ankle sprain per KAREN Cabezas 6/27/19, not 7/16/19 which was a routine OV.     I advised AGATA Snyder that pt was still using crutches during OV 7/16/19, per Dr. Uribe, but there is no notation about limited ROM or return to work. I advised AGATA Snyder, that she was seen for this problem by Dr. Collier, sports medicine, 7/3/19, and imagine had been done at his office. I advised AGATA Snyder, that she was referred to ortho per Dr. Collier, but she has not heard from their office re: scheduling appt.    AGATA Snyder demonstrated understanding. KD

## 2019-09-24 DIAGNOSIS — E03.8 HYPOTHYROIDISM DUE TO HASHIMOTO'S THYROIDITIS: Primary | ICD-10-CM

## 2019-09-24 DIAGNOSIS — E06.3 HYPOTHYROIDISM DUE TO HASHIMOTO'S THYROIDITIS: Primary | ICD-10-CM

## 2020-03-16 ENCOUNTER — E-VISIT (OUTPATIENT)
Dept: RETAIL CLINIC | Facility: CLINIC | Age: 50
End: 2020-03-16

## 2020-03-16 DIAGNOSIS — R05.9 COUGH IN ADULT: Primary | ICD-10-CM

## 2020-03-16 PROCEDURE — 99421 OL DIG E/M SVC 5-10 MIN: CPT | Performed by: NURSE PRACTITIONER

## 2020-03-17 NOTE — PROGRESS NOTES
S:  Cough for one week with sore throat, chest pain, swollen glands and headache as well as fatigue, body aches.  Former smoker and using inhaler but not helping.  Also tried multiple OTCs that are not helpful. No travel or COVID exposures.       A/P:  Cough, possible Influenza  Symptomatic treatment with OTCs as before, contact PCP via phone if worsen for further evaluation or testing. Recommend self quarantine for 14 days of symptoms onset for precaution.

## 2020-03-17 NOTE — PATIENT INSTRUCTIONS
Cough, Adult    A cough helps to clear your throat and lungs. A cough may last only 2-3 weeks (acute), or it may last longer than 8 weeks (chronic). Many different things can cause a cough. A cough may be a sign of an illness or another medical condition.  Follow these instructions at home:  · Pay attention to any changes in your cough.  · Take medicines only as told by your doctor.  ? If you were prescribed an antibiotic medicine, take it as told by your doctor. Do not stop taking it even if you start to feel better.  ? Talk with your doctor before you try using a cough medicine.  · Drink enough fluid to keep your pee (urine) clear or pale yellow.  · If the air is dry, use a cold steam vaporizer or humidifier in your home.  · Stay away from things that make you cough at work or at home.  · If your cough is worse at night, try using extra pillows to raise your head up higher while you sleep.  · Do not smoke, and try not to be around smoke. If you need help quitting, ask your doctor.  · Do not have caffeine.  · Do not drink alcohol.  · Rest as needed.  Contact a doctor if:  · You have new problems (symptoms).  · You cough up yellow fluid (pus).  · Your cough does not get better after 2-3 weeks, or your cough gets worse.  · Medicine does not help your cough and you are not sleeping well.  · You have pain that gets worse or pain that is not helped with medicine.  · You have a fever.  · You are losing weight and you do not know why.  · You have night sweats.  Get help right away if:  · You cough up blood.  · You have trouble breathing.  · Your heartbeat is very fast.  This information is not intended to replace advice given to you by your health care provider. Make sure you discuss any questions you have with your health care provider.  Document Released: 08/30/2012 Document Revised: 05/25/2017 Document Reviewed: 02/24/2016  [employer] Interactive Patient Education © 2019 [employer]

## 2020-03-25 ENCOUNTER — APPOINTMENT (OUTPATIENT)
Dept: GENERAL RADIOLOGY | Facility: HOSPITAL | Age: 50
End: 2020-03-25

## 2020-03-25 ENCOUNTER — HOSPITAL ENCOUNTER (EMERGENCY)
Facility: HOSPITAL | Age: 50
Discharge: HOME | End: 2020-03-25
Attending: EMERGENCY MEDICINE | Admitting: EMERGENCY MEDICINE

## 2020-03-25 ENCOUNTER — TELEMEDICINE (OUTPATIENT)
Dept: INTERNAL MEDICINE | Age: 50
End: 2020-03-25

## 2020-03-25 ENCOUNTER — E-VISIT (OUTPATIENT)
Dept: INTERNAL MEDICINE | Age: 50
End: 2020-03-25

## 2020-03-25 VITALS
SYSTOLIC BLOOD PRESSURE: 147 MMHG | HEIGHT: 59 IN | RESPIRATION RATE: 18 BRPM | TEMPERATURE: 98.9 F | HEART RATE: 82 BPM | OXYGEN SATURATION: 94 % | DIASTOLIC BLOOD PRESSURE: 98 MMHG | BODY MASS INDEX: 54.43 KG/M2 | WEIGHT: 270 LBS

## 2020-03-25 DIAGNOSIS — R06.00 DYSPNEA AND RESPIRATORY ABNORMALITIES: ICD-10-CM

## 2020-03-25 DIAGNOSIS — R05.9 COUGH WITH FEVER: Primary | ICD-10-CM

## 2020-03-25 DIAGNOSIS — R05.9 COUGH: ICD-10-CM

## 2020-03-25 DIAGNOSIS — J45.901 EXACERBATION OF ASTHMA, UNSPECIFIED ASTHMA SEVERITY, UNSPECIFIED WHETHER PERSISTENT: Primary | ICD-10-CM

## 2020-03-25 DIAGNOSIS — Z20.822 CLOSE EXPOSURE TO COVID-19 VIRUS: ICD-10-CM

## 2020-03-25 DIAGNOSIS — R06.89 DYSPNEA AND RESPIRATORY ABNORMALITIES: ICD-10-CM

## 2020-03-25 DIAGNOSIS — R50.9 COUGH WITH FEVER: Primary | ICD-10-CM

## 2020-03-25 DIAGNOSIS — J45.901 SEVERE ASTHMA WITH ACUTE EXACERBATION, UNSPECIFIED WHETHER PERSISTENT: ICD-10-CM

## 2020-03-25 LAB
ALBUMIN SERPL-MCNC: 4.3 G/DL (ref 3.5–5.2)
ALBUMIN/GLOB SERPL: 1.5 G/DL
ALP SERPL-CCNC: 64 U/L (ref 39–117)
ALT SERPL W P-5'-P-CCNC: 21 U/L (ref 1–33)
ANION GAP SERPL CALCULATED.3IONS-SCNC: 13.1 MMOL/L (ref 5–15)
AST SERPL-CCNC: 20 U/L (ref 1–32)
B PARAPERT DNA SPEC QL NAA+PROBE: NOT DETECTED
B PERT DNA SPEC QL NAA+PROBE: NOT DETECTED
BASOPHILS # BLD AUTO: 0.04 10*3/MM3 (ref 0–0.2)
BASOPHILS NFR BLD AUTO: 0.4 % (ref 0–1.5)
BILIRUB SERPL-MCNC: 0.3 MG/DL (ref 0.2–1.2)
BUN BLD-MCNC: 16 MG/DL (ref 6–20)
BUN/CREAT SERPL: 21.3 (ref 7–25)
C PNEUM DNA NPH QL NAA+NON-PROBE: NOT DETECTED
CALCIUM SPEC-SCNC: 9.4 MG/DL (ref 8.6–10.5)
CHLORIDE SERPL-SCNC: 98 MMOL/L (ref 98–107)
CO2 SERPL-SCNC: 23.9 MMOL/L (ref 22–29)
CREAT BLD-MCNC: 0.75 MG/DL (ref 0.57–1)
D-LACTATE SERPL-SCNC: 1 MMOL/L (ref 0.5–2)
DEPRECATED RDW RBC AUTO: 42.1 FL (ref 37–54)
EOSINOPHIL # BLD AUTO: 0.12 10*3/MM3 (ref 0–0.4)
EOSINOPHIL NFR BLD AUTO: 1.3 % (ref 0.3–6.2)
ERYTHROCYTE [DISTWIDTH] IN BLOOD BY AUTOMATED COUNT: 12.8 % (ref 12.3–15.4)
FLUAV H1 2009 PAND RNA NPH QL NAA+PROBE: NOT DETECTED
FLUAV H1 HA GENE NPH QL NAA+PROBE: NOT DETECTED
FLUAV H3 RNA NPH QL NAA+PROBE: NOT DETECTED
FLUAV SUBTYP SPEC NAA+PROBE: NOT DETECTED
FLUBV RNA ISLT QL NAA+PROBE: NOT DETECTED
GFR SERPL CREATININE-BSD FRML MDRD: 82 ML/MIN/1.73
GLOBULIN UR ELPH-MCNC: 2.8 GM/DL
GLUCOSE BLD-MCNC: 108 MG/DL (ref 65–99)
HADV DNA SPEC NAA+PROBE: NOT DETECTED
HCOV 229E RNA SPEC QL NAA+PROBE: NOT DETECTED
HCOV HKU1 RNA SPEC QL NAA+PROBE: NOT DETECTED
HCOV NL63 RNA SPEC QL NAA+PROBE: NOT DETECTED
HCOV OC43 RNA SPEC QL NAA+PROBE: NOT DETECTED
HCT VFR BLD AUTO: 38.6 % (ref 34–46.6)
HGB BLD-MCNC: 13 G/DL (ref 12–15.9)
HMPV RNA NPH QL NAA+NON-PROBE: NOT DETECTED
HOLD SPECIMEN: NORMAL
HOLD SPECIMEN: NORMAL
HPIV1 RNA SPEC QL NAA+PROBE: NOT DETECTED
HPIV2 RNA SPEC QL NAA+PROBE: NOT DETECTED
HPIV3 RNA NPH QL NAA+PROBE: NOT DETECTED
HPIV4 P GENE NPH QL NAA+PROBE: NOT DETECTED
IMM GRANULOCYTES # BLD AUTO: 0.03 10*3/MM3 (ref 0–0.05)
IMM GRANULOCYTES NFR BLD AUTO: 0.3 % (ref 0–0.5)
LYMPHOCYTES # BLD AUTO: 2.3 10*3/MM3 (ref 0.7–3.1)
LYMPHOCYTES NFR BLD AUTO: 24.9 % (ref 19.6–45.3)
M PNEUMO IGG SER IA-ACNC: NOT DETECTED
MCH RBC QN AUTO: 30.9 PG (ref 26.6–33)
MCHC RBC AUTO-ENTMCNC: 33.7 G/DL (ref 31.5–35.7)
MCV RBC AUTO: 91.7 FL (ref 79–97)
MONOCYTES # BLD AUTO: 0.56 10*3/MM3 (ref 0.1–0.9)
MONOCYTES NFR BLD AUTO: 6.1 % (ref 5–12)
NEUTROPHILS # BLD AUTO: 6.17 10*3/MM3 (ref 1.7–7)
NEUTROPHILS NFR BLD AUTO: 67 % (ref 42.7–76)
NRBC BLD AUTO-RTO: 0 /100 WBC (ref 0–0.2)
PLATELET # BLD AUTO: 291 10*3/MM3 (ref 140–450)
PMV BLD AUTO: 9.5 FL (ref 6–12)
POTASSIUM BLD-SCNC: 3.5 MMOL/L (ref 3.5–5.2)
PROCALCITONIN SERPL-MCNC: 0.14 NG/ML (ref 0.1–0.25)
PROT SERPL-MCNC: 7.1 G/DL (ref 6–8.5)
RBC # BLD AUTO: 4.21 10*6/MM3 (ref 3.77–5.28)
RHINOVIRUS RNA SPEC NAA+PROBE: NOT DETECTED
RSV RNA NPH QL NAA+NON-PROBE: NOT DETECTED
SODIUM BLD-SCNC: 135 MMOL/L (ref 136–145)
WBC NRBC COR # BLD: 9.22 10*3/MM3 (ref 3.4–10.8)
WHOLE BLOOD HOLD SPECIMEN: NORMAL
WHOLE BLOOD HOLD SPECIMEN: NORMAL

## 2020-03-25 PROCEDURE — 0100U HC BIOFIRE FILMARRAY RESP PANEL 2: CPT | Performed by: NURSE PRACTITIONER

## 2020-03-25 PROCEDURE — 84145 PROCALCITONIN (PCT): CPT | Performed by: NURSE PRACTITIONER

## 2020-03-25 PROCEDURE — 83605 ASSAY OF LACTIC ACID: CPT | Performed by: NURSE PRACTITIONER

## 2020-03-25 PROCEDURE — 99214 OFFICE O/P EST MOD 30 MIN: CPT | Performed by: INTERNAL MEDICINE

## 2020-03-25 PROCEDURE — 85025 COMPLETE CBC W/AUTO DIFF WBC: CPT | Performed by: NURSE PRACTITIONER

## 2020-03-25 PROCEDURE — 99283 EMERGENCY DEPT VISIT LOW MDM: CPT

## 2020-03-25 PROCEDURE — 80053 COMPREHEN METABOLIC PANEL: CPT | Performed by: NURSE PRACTITIONER

## 2020-03-25 PROCEDURE — 71045 X-RAY EXAM CHEST 1 VIEW: CPT

## 2020-03-25 RX ORDER — BENZONATATE 200 MG/1
200 CAPSULE ORAL 3 TIMES DAILY PRN
Qty: 15 CAPSULE | Refills: 0 | Status: SHIPPED | OUTPATIENT
Start: 2020-03-25 | End: 2022-03-31

## 2020-03-25 RX ORDER — PROMETHAZINE HYDROCHLORIDE 12.5 MG/1
12.5 TABLET ORAL EVERY 8 HOURS PRN
Qty: 12 TABLET | Refills: 0 | Status: SHIPPED | OUTPATIENT
Start: 2020-03-25 | End: 2022-03-31

## 2020-03-25 NOTE — PROGRESS NOTES
[facility]NAL MEDICINE  SHARRI FARRIS M.D.      Maria Esther HENRIQUEZ Godfrey / 49 y.o. / female  03/25/2020      CC:  Main reason(s) for today's visit: Cough with fever (SOB, direct exposure to COVID-19)      HPI:     Coronavirus exposure history is POSITIVE. 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 Her symptoms initially began about 3 weeks ago but acutely worsened last 2-3 days. Her fever spiked at 102.4 today. Complains of severe protracted cough, wheezing and significant shortness of breath. Cough is mostly nonproductive. She has history of smoking and asthma. She has needed to use her inhaler much more frequently.       Patient Care Team:  Luiz Farris MD as PCP - General (Internal Medicine)    ASSESSMENT & PLAN:    1. Cough with fever    2. Dyspnea and respiratory abnormalities    3. Close Exposure to Covid-19 Virus    4. Severe asthma with acute exacerbation, unspecified whether persistent      No orders of the defined types were placed in this encounter.      Summary/Discussion:  Patient presents with symptoms highly suggestive of either COVID-19 or the flu.   Due to her high fever, severe cough and shortness of breath she was directed to go to the ED right away.   Proper precautions give (mask, gloves, etc)  She expressed understanding and agreed to follow above instructions.     I contacted the [facility] ED charge nurse and informed of patient's destination.    Next Appointment with me: Visit date not found    No follow-ups on file.    ____________________________________________________________________    MEDICATIONS  Current Outpatient Medications   Medication Sig Dispense Refill   • albuterol (VENTOLIN HFA) 108 (90 Base) MCG/ACT inhaler Inhale 2 puffs Every 4 (Four) Hours As Needed for Wheezing or Shortness of Air. 1 inhaler 2   • fluticasone (FLONASE) 50 MCG/ACT nasal spray 1 spray into the nostril(s) as directed by provider 2 (Two) Times a Day. (Patient taking differently: 1  spray into the nostril(s) as directed by provider 2 (Two) Times a Day As Needed.) 1 bottle 5   • levothyroxine (SYNTHROID, LEVOTHROID) 175 MCG tablet Take 1 tablet by mouth Daily. 30 tablet 3   • lisdexamfetamine (VYVANSE) 50 MG capsule Take 50 mg by mouth Daily         No current facility-administered medications for this visit.           ____________________________________________________________________      REVIEW OF SYSTEMS    Review of Systems   Constitutional: Positive for fever.   Respiratory: Positive for cough, shortness of breath and wheezing.    Cardiovascular: Negative.    Gastrointestinal: Positive for nausea. Negative for diarrhea.   Neurological: Positive for headaches.   All other systems reviewed and negative       VITALS    There were no vitals taken for this visit.    BP Readings from Last 3 Encounters:   07/16/19 140/74   07/03/19 144/80   06/27/19 126/80     Wt Readings from Last 3 Encounters:   07/03/19 118 kg (260 lb)   06/21/19 118 kg (260 lb)   02/01/19 126 kg (278 lb 9.6 oz)      There is no height or weight on file to calculate BMI.    PHYSICAL EXAMINATION    Physical Exam   Constitutional: She appears ill. She appears distressed (moderate).   Neurological: She is alert.   Psychiatric: Judgment normal.       REVIEWED DATA:    Labs:         Imaging:         Medical Tests:         Summary of old records / correspondence / consultant report:          Request outside records:         ALLERGIES  Allergies   Allergen Reactions   • Peanut Oil Anaphylaxis, Itching, Nausea And Vomiting and Rash   • Penicillins Hives and Shortness Of Breath   • Codeine Nausea And Vomiting and Rash   • Epinephrine Photosensitivity, Nausea And Vomiting, Palpitations and Other (See Comments)     Hypertension          PFSH:     The following portions of the patient's history were reviewed and updated as appropriate: Allergies / Current Medications / Past Medical History / Surgical History / Social History / Family  History    PROBLEM LIST   Patient Active Problem List   Diagnosis   • Asthma   • Environmental and seasonal allergies   • Morbid obesity (CMS/HCC)   • Attention deficit hyperactivity disorder (ADHD), combined type   • Chronic left-sided low back pain   • Chronic insomnia   • Hypothyroidism due to Hashimoto's thyroiditis   • Sprain of left ankle       PAST MEDICAL HISTORY  Past Medical History:   Diagnosis Date   • ADHD (attention deficit hyperactivity disorder)    • Allergic 1981    PCN,CODEINE; MULTIPLE Environmental and food   • Ankle sprain 6/21/2019   • Anxiety    • Arthritis    • Asthma    • Brain concussion     Multi last 2016   • Cholelithiasis 2/1/2019    Gallbladder removed 2/2/2019   • Foot sprain     Multi bilateral   • Fracture of wrist 1981    Left arm   • Hashimoto's disease    • Heart murmur 1984    Pulmonary ejection murmur   • Hernia 1992    Umbilical-SX repaired   • History of bulimia     teenage years/young adult   • HL (hearing loss) 2013    Undiagnosed   • Hypothyroidism    • Insomnia    • Irritable bowel syndrome 1992    Not being treated   • Low back pain 8/2016    Multiple falls   • Obesity    • Seizures (CMS/HCC) 1984    Dx petit mal; never medicated   • Urinary tract infection 1994    Multiple over years   • Visual impairment 1973       SURGICAL HISTORY  Past Surgical History:   Procedure Laterality Date   • BREAST AUGMENTATION Bilateral 1992, 1998    Dr. Eng   • BREAST SURGERY  1992,1998    Implants; implants replaced and lift   • CHOLECYSTECTOMY  2/2/2019   • CHOLECYSTECTOMY WITH INTRAOPERATIVE CHOLANGIOGRAM N/A 2/2/2019    Procedure: CHOLECYSTECTOMY LAPAROSCOPIC;  Surgeon: Cruz Grant MD;  Location: [facility];  Service: General   • COSMETIC SURGERY  1992;1998    Liposuction...bilateral thighs, hips, buttocks 8   • LIPOSUCTION N/A 1992   • UMBILICAL HERNIA REPAIR N/A 1992    Dr. Pérez       SOCIAL HISTORY  Social History     Socioeconomic History   • Marital status:       Spouse name: Anthony   • Number of children: 0   • Years of education: Not on file   • Highest education level: Not on file   Occupational History   • Occupation: RN      Comment: Psychiatric nurse (adolescent)   Tobacco Use   • Smoking status: Former Smoker     Packs/day: 0.25     Years: 5.00     Pack years: 1.25     Types: Cigarettes     Start date: 2009     Last attempt to quit: 2014     Years since quittin.2   • Smokeless tobacco: Never Used   Substance and Sexual Activity   • Alcohol use: Yes     Comment: rarely, 2 glasses yearly   • Drug use: No   • Sexual activity: Yes     Partners: Male     Birth control/protection: Coitus interruptus   Lifestyle   • Physical activity:     Days per week: 0 days     Minutes per session: Not on file   • Stress: Not on file       FAMILY HISTORY  Family History   Problem Relation Age of Onset   • Kidney disease Mother    • Valvular heart disease Mother    • Skin cancer Mother    • Arthritis Mother    • Cancer Mother         Skin   • Hearing loss Mother    • Heart disease Mother    • Hyperlipidemia Mother    • Osteoporosis Mother    • Alzheimer's disease Father 70   • Bipolar disorder Father    • Skin cancer Father    • Cancer Father         Skin   • Alzheimer's disease Paternal Aunt 70   • Bipolar disorder Sister    • Bipolar disorder Brother    • Skin cancer Brother    • Bipolar disorder Sister    • ADD / ADHD Sister    • Cervical cancer Maternal Grandmother    • Cancer Maternal Grandmother         Cervical cancer   • Alzheimer's disease Paternal Grandmother    • Cancer Sister         Skin   • Cancer Sister         Skin   • Cancer Brother         Skin   • Stroke Maternal Grandfather         Stroke    • Colon cancer Neg Hx    • Breast cancer Neg Hx    • Diabetes Neg Hx          **José Miguelon Disclaimer:   Much of this encounter note is an electronic transcription/translation of spoken language to printed text. The electronic translation of spoken language may  permit erroneous, or at times, nonsensical words or phrases to be inadvertently transcribed. Although I have reviewed the note for such errors, some may still exist.     Template created by Sabiha Uribe MD

## 2020-03-26 ENCOUNTER — TELEPHONE (OUTPATIENT)
Dept: INTERNAL MEDICINE | Age: 50
End: 2020-03-26

## 2020-03-26 NOTE — TELEPHONE ENCOUNTER
Call patient:    She actually needs testing. Have her go to [facility] in [place] to get tested.   The ER did not test her because they can't do it there.   She needs to quarantine for 14 days regardless.   She cannot return to work at this time.

## 2020-03-26 NOTE — TELEPHONE ENCOUNTER
Pt. Went to  ER yesterday and her fever is gone, O2 stats are great, blood work great but still has cough.  ER stated COVID-19 test was not needed based on her symptoms but that she should self quarantine for 14 days. She stated she self quarantined 14 days prior to her ER visit yesterday. She feels fine other than coughing but wants to know if she can return to work? If she can she will need a note stating she can and it will need to be faxed.

## 2020-03-26 NOTE — TELEPHONE ENCOUNTER
Informed pt .[place] [facility] [place]

## 2021-02-04 ENCOUNTER — TELEPHONE (OUTPATIENT)
Dept: INTERNAL MEDICINE | Age: 51
End: 2021-02-04

## 2021-02-04 NOTE — TELEPHONE ENCOUNTER
Pt was called to get information on her pain and fall. Pt stated she had  2 falls within the past week. Pt states she cant walk without help and her backs locks up. Marti informed patent to go to ER.

## 2021-04-02 ENCOUNTER — BULK ORDERING (OUTPATIENT)
Dept: CASE MANAGEMENT | Facility: OTHER | Age: 51
End: 2021-04-02

## 2021-04-02 DIAGNOSIS — Z23 IMMUNIZATION DUE: ICD-10-CM

## 2022-01-27 ENCOUNTER — HOSPITAL ENCOUNTER (EMERGENCY)
Facility: HOSPITAL | Age: 52
Discharge: HOME | End: 2022-01-27
Attending: EMERGENCY MEDICINE | Admitting: EMERGENCY MEDICINE

## 2022-01-27 ENCOUNTER — APPOINTMENT (OUTPATIENT)
Dept: GENERAL RADIOLOGY | Facility: HOSPITAL | Age: 52
End: 2022-01-27

## 2022-01-27 VITALS
RESPIRATION RATE: 20 BRPM | OXYGEN SATURATION: 97 % | HEART RATE: 91 BPM | DIASTOLIC BLOOD PRESSURE: 90 MMHG | SYSTOLIC BLOOD PRESSURE: 167 MMHG | TEMPERATURE: 99 F

## 2022-01-27 DIAGNOSIS — J45.20 MILD INTERMITTENT ASTHMA WITHOUT COMPLICATION: Chronic | ICD-10-CM

## 2022-01-27 DIAGNOSIS — R06.02 SHORTNESS OF BREATH: ICD-10-CM

## 2022-01-27 DIAGNOSIS — J45.901 MODERATE ASTHMA WITH EXACERBATION, UNSPECIFIED WHETHER PERSISTENT: Primary | ICD-10-CM

## 2022-01-27 LAB
ALBUMIN SERPL-MCNC: 4 G/DL (ref 3.5–5.2)
ALBUMIN/GLOB SERPL: 1.3 G/DL
ALP SERPL-CCNC: 65 U/L (ref 39–117)
ALT SERPL W P-5'-P-CCNC: 22 U/L (ref 1–33)
ANION GAP SERPL CALCULATED.3IONS-SCNC: 14 MMOL/L (ref 5–15)
AST SERPL-CCNC: 16 U/L (ref 1–32)
BASOPHILS # BLD AUTO: 0.06 10*3/MM3 (ref 0–0.2)
BASOPHILS NFR BLD AUTO: 0.6 % (ref 0–1.5)
BILIRUB SERPL-MCNC: 0.4 MG/DL (ref 0–1.2)
BUN SERPL-MCNC: 17 MG/DL (ref 6–20)
BUN/CREAT SERPL: 17.2 (ref 7–25)
CALCIUM SPEC-SCNC: 9.2 MG/DL (ref 8.6–10.5)
CHLORIDE SERPL-SCNC: 107 MMOL/L (ref 98–107)
CO2 SERPL-SCNC: 21 MMOL/L (ref 22–29)
CREAT SERPL-MCNC: 0.99 MG/DL (ref 0.57–1)
DEPRECATED RDW RBC AUTO: 45.4 FL (ref 37–54)
EOSINOPHIL # BLD AUTO: 0.04 10*3/MM3 (ref 0–0.4)
EOSINOPHIL NFR BLD AUTO: 0.4 % (ref 0.3–6.2)
ERYTHROCYTE [DISTWIDTH] IN BLOOD BY AUTOMATED COUNT: 13.1 % (ref 12.3–15.4)
GFR SERPL CREATININE-BSD FRML MDRD: 59 ML/MIN/1.73
GLOBULIN UR ELPH-MCNC: 3.1 GM/DL
GLUCOSE SERPL-MCNC: 113 MG/DL (ref 65–99)
HCG SERPL QL: NEGATIVE
HCT VFR BLD AUTO: 38.7 % (ref 34–46.6)
HGB BLD-MCNC: 12.7 G/DL (ref 12–15.9)
HOLD SPECIMEN: NORMAL
IMM GRANULOCYTES # BLD AUTO: 0.05 10*3/MM3 (ref 0–0.05)
IMM GRANULOCYTES NFR BLD AUTO: 0.5 % (ref 0–0.5)
LYMPHOCYTES # BLD AUTO: 1.76 10*3/MM3 (ref 0.7–3.1)
LYMPHOCYTES NFR BLD AUTO: 16.4 % (ref 19.6–45.3)
MCH RBC QN AUTO: 31.1 PG (ref 26.6–33)
MCHC RBC AUTO-ENTMCNC: 32.8 G/DL (ref 31.5–35.7)
MCV RBC AUTO: 94.9 FL (ref 79–97)
MONOCYTES # BLD AUTO: 0.5 10*3/MM3 (ref 0.1–0.9)
MONOCYTES NFR BLD AUTO: 4.7 % (ref 5–12)
NEUTROPHILS NFR BLD AUTO: 77.4 % (ref 42.7–76)
NEUTROPHILS NFR BLD AUTO: 8.31 10*3/MM3 (ref 1.7–7)
NRBC BLD AUTO-RTO: 0.1 /100 WBC (ref 0–0.2)
NT-PROBNP SERPL-MCNC: 226 PG/ML (ref 0–900)
PLATELET # BLD AUTO: 297 10*3/MM3 (ref 140–450)
PMV BLD AUTO: 10.1 FL (ref 6–12)
POTASSIUM SERPL-SCNC: 3.9 MMOL/L (ref 3.5–5.2)
PROT SERPL-MCNC: 7.1 G/DL (ref 6–8.5)
RBC # BLD AUTO: 4.08 10*6/MM3 (ref 3.77–5.28)
SODIUM SERPL-SCNC: 142 MMOL/L (ref 136–145)
TROPONIN T SERPL-MCNC: <0.01 NG/ML (ref 0–0.03)
WBC NRBC COR # BLD: 10.72 10*3/MM3 (ref 3.4–10.8)
WHOLE BLOOD HOLD SPECIMEN: NORMAL
WHOLE BLOOD HOLD SPECIMEN: NORMAL

## 2022-01-27 PROCEDURE — 84703 CHORIONIC GONADOTROPIN ASSAY: CPT | Performed by: EMERGENCY MEDICINE

## 2022-01-27 PROCEDURE — 93010 ELECTROCARDIOGRAM REPORT: CPT | Performed by: INTERNAL MEDICINE

## 2022-01-27 PROCEDURE — 71046 X-RAY EXAM CHEST 2 VIEWS: CPT

## 2022-01-27 PROCEDURE — 94640 AIRWAY INHALATION TREATMENT: CPT

## 2022-01-27 PROCEDURE — 63710000001 PREDNISONE PER 1 MG: Performed by: EMERGENCY MEDICINE

## 2022-01-27 PROCEDURE — 80053 COMPREHEN METABOLIC PANEL: CPT | Performed by: EMERGENCY MEDICINE

## 2022-01-27 PROCEDURE — 83880 ASSAY OF NATRIURETIC PEPTIDE: CPT | Performed by: EMERGENCY MEDICINE

## 2022-01-27 PROCEDURE — 84484 ASSAY OF TROPONIN QUANT: CPT | Performed by: EMERGENCY MEDICINE

## 2022-01-27 PROCEDURE — 93005 ELECTROCARDIOGRAM TRACING: CPT | Performed by: EMERGENCY MEDICINE

## 2022-01-27 PROCEDURE — 94799 UNLISTED PULMONARY SVC/PX: CPT

## 2022-01-27 PROCEDURE — 85025 COMPLETE CBC W/AUTO DIFF WBC: CPT | Performed by: EMERGENCY MEDICINE

## 2022-01-27 PROCEDURE — 99283 EMERGENCY DEPT VISIT LOW MDM: CPT

## 2022-01-27 RX ORDER — ALBUTEROL SULFATE 90 UG/1
2 AEROSOL, METERED RESPIRATORY (INHALATION) EVERY 4 HOURS PRN
Qty: 18 G | Refills: 0 | Status: SHIPPED | OUTPATIENT
Start: 2022-01-27

## 2022-01-27 RX ORDER — IPRATROPIUM BROMIDE AND ALBUTEROL SULFATE 2.5; .5 MG/3ML; MG/3ML
3 SOLUTION RESPIRATORY (INHALATION) ONCE
Status: COMPLETED | OUTPATIENT
Start: 2022-01-27 | End: 2022-01-27

## 2022-01-27 RX ORDER — PREDNISONE 20 MG/1
60 TABLET ORAL ONCE
Status: COMPLETED | OUTPATIENT
Start: 2022-01-27 | End: 2022-01-27

## 2022-01-27 RX ORDER — GUAIFENESIN AND DEXTROMETHORPHAN HYDROBROMIDE 600; 30 MG/1; MG/1
1 TABLET, EXTENDED RELEASE ORAL 2 TIMES DAILY PRN
Qty: 20 TABLET | Refills: 0 | Status: SHIPPED | OUTPATIENT
Start: 2022-01-27 | End: 2022-03-31

## 2022-01-27 RX ORDER — SODIUM CHLORIDE 0.9 % (FLUSH) 0.9 %
10 SYRINGE (ML) INJECTION AS NEEDED
Status: DISCONTINUED | OUTPATIENT
Start: 2022-01-27 | End: 2022-01-28 | Stop reason: HOSPADM

## 2022-01-27 RX ORDER — PREDNISONE 20 MG/1
60 TABLET ORAL DAILY
Qty: 9 TABLET | Refills: 0 | Status: SHIPPED | OUTPATIENT
Start: 2022-01-27 | End: 2022-03-31

## 2022-01-27 RX ADMIN — PREDNISONE 60 MG: 20 TABLET ORAL at 21:47

## 2022-01-27 RX ADMIN — IPRATROPIUM BROMIDE AND ALBUTEROL SULFATE 3 ML: 2.5; .5 SOLUTION RESPIRATORY (INHALATION) at 21:31

## 2022-01-28 LAB — QT INTERVAL: 336 MS

## 2022-02-18 ENCOUNTER — TRANSCRIBE ORDERS (OUTPATIENT)
Dept: ADMINISTRATIVE | Facility: HOSPITAL | Age: 52
End: 2022-02-18

## 2022-02-18 DIAGNOSIS — R60.9 SWELLING: ICD-10-CM

## 2022-02-18 DIAGNOSIS — R06.02 SOB (SHORTNESS OF BREATH): Primary | ICD-10-CM

## 2022-03-08 ENCOUNTER — HOSPITAL ENCOUNTER (OUTPATIENT)
Dept: CT IMAGING | Facility: HOSPITAL | Age: 52
Discharge: HOME | End: 2022-03-08

## 2022-03-08 ENCOUNTER — HOSPITAL ENCOUNTER (OUTPATIENT)
Dept: CARDIOLOGY | Facility: HOSPITAL | Age: 52
Discharge: HOME | End: 2022-03-08

## 2022-03-08 DIAGNOSIS — R06.02 SOB (SHORTNESS OF BREATH): ICD-10-CM

## 2022-03-08 DIAGNOSIS — R60.9 SWELLING: ICD-10-CM

## 2022-03-08 LAB

## 2022-03-08 PROCEDURE — 93970 EXTREMITY STUDY: CPT

## 2022-03-08 PROCEDURE — 25010000002 IOPAMIDOL 61 % SOLUTION: Performed by: NURSE PRACTITIONER

## 2022-03-08 PROCEDURE — 71275 CT ANGIOGRAPHY CHEST: CPT

## 2022-03-08 RX ADMIN — IOPAMIDOL 145 ML: 612 INJECTION, SOLUTION INTRAVENOUS at 11:24

## 2022-03-24 ENCOUNTER — HOSPITAL ENCOUNTER (OUTPATIENT)
Facility: HOSPITAL | Age: 52
Setting detail: HOSPITAL OUTPATIENT SURGERY
End: 2022-03-24
Attending: ORTHOPAEDIC SURGERY | Admitting: ORTHOPAEDIC SURGERY

## 2022-03-31 ENCOUNTER — PRE-ADMISSION TESTING (OUTPATIENT)
Dept: PREADMISSION TESTING | Facility: HOSPITAL | Age: 52
End: 2022-03-31

## 2022-03-31 VITALS
WEIGHT: 293 LBS | HEART RATE: 92 BPM | HEIGHT: 60 IN | BODY MASS INDEX: 57.52 KG/M2 | OXYGEN SATURATION: 100 % | TEMPERATURE: 98 F | DIASTOLIC BLOOD PRESSURE: 80 MMHG | RESPIRATION RATE: 18 BRPM | SYSTOLIC BLOOD PRESSURE: 180 MMHG

## 2022-03-31 LAB
ANION GAP SERPL CALCULATED.3IONS-SCNC: 11.1 MMOL/L (ref 5–15)
BUN SERPL-MCNC: 9 MG/DL (ref 6–20)
BUN/CREAT SERPL: 10.8 (ref 7–25)
CALCIUM SPEC-SCNC: 9.4 MG/DL (ref 8.6–10.5)
CHLORIDE SERPL-SCNC: 103 MMOL/L (ref 98–107)
CO2 SERPL-SCNC: 23.9 MMOL/L (ref 22–29)
CREAT SERPL-MCNC: 0.83 MG/DL (ref 0.57–1)
DEPRECATED RDW RBC AUTO: 44.2 FL (ref 37–54)
EGFRCR SERPLBLD CKD-EPI 2021: 86 ML/MIN/1.73
ERYTHROCYTE [DISTWIDTH] IN BLOOD BY AUTOMATED COUNT: 12.7 % (ref 12.3–15.4)
GLUCOSE SERPL-MCNC: 100 MG/DL (ref 65–99)
HBA1C MFR BLD: 5.6 % (ref 4.8–5.6)
HCT VFR BLD AUTO: 44.8 % (ref 34–46.6)
HGB BLD-MCNC: 14.9 G/DL (ref 12–15.9)
MCH RBC QN AUTO: 31.2 PG (ref 26.6–33)
MCHC RBC AUTO-ENTMCNC: 33.3 G/DL (ref 31.5–35.7)
MCV RBC AUTO: 93.9 FL (ref 79–97)
PLATELET # BLD AUTO: 334 10*3/MM3 (ref 140–450)
PMV BLD AUTO: 9.8 FL (ref 6–12)
POTASSIUM SERPL-SCNC: 3.7 MMOL/L (ref 3.5–5.2)
RBC # BLD AUTO: 4.77 10*6/MM3 (ref 3.77–5.28)
SODIUM SERPL-SCNC: 138 MMOL/L (ref 136–145)
WBC NRBC COR # BLD: 10.67 10*3/MM3 (ref 3.4–10.8)

## 2022-03-31 PROCEDURE — 85027 COMPLETE CBC AUTOMATED: CPT

## 2022-03-31 PROCEDURE — 80048 BASIC METABOLIC PNL TOTAL CA: CPT

## 2022-03-31 PROCEDURE — 36415 COLL VENOUS BLD VENIPUNCTURE: CPT

## 2022-03-31 PROCEDURE — 83036 HEMOGLOBIN GLYCOSYLATED A1C: CPT

## 2022-03-31 RX ORDER — ACETAMINOPHEN 500 MG
500 TABLET ORAL EVERY 6 HOURS PRN
COMMUNITY

## 2022-03-31 RX ORDER — PROMETHAZINE HYDROCHLORIDE 25 MG/1
25 TABLET ORAL EVERY 6 HOURS PRN
COMMUNITY
Start: 2022-02-26

## 2022-03-31 RX ORDER — TIZANIDINE 4 MG/1
1 TABLET ORAL DAILY PRN
COMMUNITY
Start: 2022-02-26

## 2022-03-31 RX ORDER — GABAPENTIN 100 MG/1
100 CAPSULE ORAL NIGHTLY PRN
COMMUNITY
Start: 2022-02-26

## 2022-04-06 ENCOUNTER — APPOINTMENT (OUTPATIENT)
Dept: LAB | Facility: HOSPITAL | Age: 52
End: 2022-04-06

## 2022-04-06 ENCOUNTER — LAB (OUTPATIENT)
Dept: LAB | Facility: HOSPITAL | Age: 52
End: 2022-04-06

## 2022-04-06 LAB — SARS-COV-2 ORF1AB RESP QL NAA+PROBE: NOT DETECTED

## 2022-04-06 PROCEDURE — U0004 COV-19 TEST NON-CDC HGH THRU: HCPCS

## 2022-04-06 PROCEDURE — C9803 HOPD COVID-19 SPEC COLLECT: HCPCS

## 2022-05-10 ENCOUNTER — HOSPITAL ENCOUNTER (OUTPATIENT)
Dept: GENERAL RADIOLOGY | Facility: HOSPITAL | Age: 52
Discharge: HOME | End: 2022-05-10

## 2022-05-10 ENCOUNTER — PRE-ADMISSION TESTING (OUTPATIENT)
Dept: PREADMISSION TESTING | Facility: HOSPITAL | Age: 52
End: 2022-05-10

## 2022-05-10 VITALS
DIASTOLIC BLOOD PRESSURE: 75 MMHG | TEMPERATURE: 97.5 F | HEART RATE: 65 BPM | OXYGEN SATURATION: 96 % | BODY MASS INDEX: 57.52 KG/M2 | WEIGHT: 293 LBS | SYSTOLIC BLOOD PRESSURE: 160 MMHG | HEIGHT: 60 IN | RESPIRATION RATE: 20 BRPM

## 2022-05-10 LAB
ALBUMIN SERPL-MCNC: 4.1 G/DL (ref 3.5–5.2)
ALBUMIN/GLOB SERPL: 1.2 G/DL
ALP SERPL-CCNC: 68 U/L (ref 39–117)
ALT SERPL W P-5'-P-CCNC: 20 U/L (ref 1–33)
ANION GAP SERPL CALCULATED.3IONS-SCNC: 13.7 MMOL/L (ref 5–15)
AST SERPL-CCNC: 13 U/L (ref 1–32)
BILIRUB SERPL-MCNC: 0.3 MG/DL (ref 0–1.2)
BUN SERPL-MCNC: 18 MG/DL (ref 6–20)
BUN/CREAT SERPL: 21.2 (ref 7–25)
CALCIUM SPEC-SCNC: 9.7 MG/DL (ref 8.6–10.5)
CHLORIDE SERPL-SCNC: 103 MMOL/L (ref 98–107)
CO2 SERPL-SCNC: 21.3 MMOL/L (ref 22–29)
CREAT SERPL-MCNC: 0.85 MG/DL (ref 0.57–1)
DEPRECATED RDW RBC AUTO: 42 FL (ref 37–54)
EGFRCR SERPLBLD CKD-EPI 2021: 83.6 ML/MIN/1.73
ERYTHROCYTE [DISTWIDTH] IN BLOOD BY AUTOMATED COUNT: 12.4 % (ref 12.3–15.4)
GLOBULIN UR ELPH-MCNC: 3.3 GM/DL
GLUCOSE SERPL-MCNC: 100 MG/DL (ref 65–99)
HBA1C MFR BLD: 5.4 % (ref 4.8–5.6)
HCT VFR BLD AUTO: 41 % (ref 34–46.6)
HGB BLD-MCNC: 13.7 G/DL (ref 12–15.9)
MCH RBC QN AUTO: 31.1 PG (ref 26.6–33)
MCHC RBC AUTO-ENTMCNC: 33.4 G/DL (ref 31.5–35.7)
MCV RBC AUTO: 93 FL (ref 79–97)
PLATELET # BLD AUTO: 301 10*3/MM3 (ref 140–450)
PMV BLD AUTO: 10.6 FL (ref 6–12)
POTASSIUM SERPL-SCNC: 4.4 MMOL/L (ref 3.5–5.2)
PROT SERPL-MCNC: 7.4 G/DL (ref 6–8.5)
RBC # BLD AUTO: 4.41 10*6/MM3 (ref 3.77–5.28)
SODIUM SERPL-SCNC: 138 MMOL/L (ref 136–145)
WBC NRBC COR # BLD: 10.86 10*3/MM3 (ref 3.4–10.8)

## 2022-05-10 PROCEDURE — 36415 COLL VENOUS BLD VENIPUNCTURE: CPT

## 2022-05-10 PROCEDURE — 85027 COMPLETE CBC AUTOMATED: CPT

## 2022-05-10 PROCEDURE — 80053 COMPREHEN METABOLIC PANEL: CPT

## 2022-05-10 PROCEDURE — 73562 X-RAY EXAM OF KNEE 3: CPT

## 2022-05-10 PROCEDURE — 83036 HEMOGLOBIN GLYCOSYLATED A1C: CPT

## 2022-05-10 RX ORDER — NEBIVOLOL 10 MG/1
10 TABLET ORAL EVERY EVENING
COMMUNITY

## 2022-05-10 RX ORDER — ERGOCALCIFEROL 1.25 MG/1
50000 CAPSULE ORAL WEEKLY
COMMUNITY

## 2022-05-10 RX ORDER — LEVOTHYROXINE SODIUM 0.15 MG/1
150 TABLET ORAL
COMMUNITY

## 2022-05-10 NOTE — DISCHARGE INSTRUCTIONS
Take the following medications the morning of surgery:    SYNTHROID    If you are on prescription narcotic pain medication to control your pain you may also take that medication the morning of surgery.    General Instructions:  Do not eat solid food after midnight the night before surgery.  You may drink clear liquids day of surgery but must stop at least one hour before your hospital arrival time.  It is beneficial for you to have a clear drink that contains carbohydrates the day of surgery.  We suggest a 12 to 20 ounce bottle of Gatorade or Powerade for non-diabetic patients     Clear liquids are liquids you can see through.  Nothing red in color.     Plain water                               Sports drinks  Sodas                                   Gelatin (Jell-O)  Fruit juices without pulp such as white grape juice and apple juice  Popsicles that contain no fruit or yogurt  Tea or coffee (no cream or milk added)  Gatorade / Powerade  G2 / Powerade Zero          Bring any papers given to you in the doctor’s office.  Wear clean comfortable clothes.  Do not wear contact lenses, false eyelashes or make-up.  Bring a case for your glasses.   Bring crutches or walker if applicable.  Remove all piercings.  Leave jewelry and any other valuables at home.  Hair extensions with metal clips must be removed prior to surgery.  The Pre-Admission Testing nurse will instruct you to bring medications if unable to obtain an accurate list in Pre-Admission Testing.    REPORT TO SURGERY ENTRANCE ON 5- AT 0515 AM          Preventing a Surgical Site Infection:  For 2 to 3 days before surgery, avoid shaving with a razor because the razor can irritate skin and make it easier to develop an infection.    Any areas of open skin can increase the risk of a post-operative wound infection by allowing bacteria to enter and travel throughout the body.  Notify your surgeon if you have any skin wounds / rashes even if it is not near the  expected surgical site.  The area will need assessed to determine if surgery should be delayed until it is healed.  The night prior to surgery shower using a fresh bar of anti-bacterial soap (such as Dial) and clean washcloth.  Sleep in a clean bed with clean clothing.  Do not allow pets to sleep with you.  Shower on the morning of surgery using a fresh bar of anti-bacterial soap (such as Dial) and clean washcloth.  Dry with a clean towel and dress in clean clothing.  Ask your surgeon if you will be receiving antibiotics prior to surgery.  Make sure you, your family, and all healthcare providers clean their hands with soap and water or an alcohol based hand  before caring for you or your wound.    Day of surgery:  Your arrival time is approximately two hours before your scheduled surgery time.  Upon arrival, a Pre-op nurse and Anesthesiologist will review your health history, obtain vital signs, and answer questions you may have.  The only belongings needed at this time will be a list of your home medications and if applicable your C-PAP/BI-PAP machine.  A Pre-op nurse will start an IV and you may receive medication in preparation for surgery, including something to help you relax.     Please be aware that surgery does come with discomfort.  We want to make every effort to control your discomfort so please discuss any uncontrolled symptoms with your nurse.   Your doctor will most likely have prescribed pain medications.      If you are going home after surgery you will receive individualized written care instructions before being discharged.  A responsible adult must drive you to and from the hospital on the day of your surgery and stay with you for 24 hours.  Discharge prescriptions can be filled by the hospital pharmacy during regular pharmacy hours.  If you are having surgery late in the day/evening your prescription may be e-prescribed to your pharmacy.  Please verify your pharmacy hours or chose a 24  hour pharmacy to avoid not having access to your prescription because your pharmacy has closed for the day.        If you have any questions please call Pre-Admission Testing at [phone].  Deductibles and co-payments are collected on the day of service. Please be prepared to pay the required co-pay, deductible or deposit on the day of service as defined by your plan.    Patient Education for Self-Quarantine Process    Following your COVID testing, we strongly recommend that you wear a mask when you are with other people and practice social distancing.   Limit your activities to only required outings.  Wash your hands with soap and water frequently for at least 20 seconds.   Avoid touching your eyes, nose and mouth with unwashed hands.  Do not share anything - utensils, drinking glasses, food from the same bowl.   Sanitize household surfaces daily. Include all high touch areas (door handles, light switches, phones, countertops, etc.)    Call your surgeon immediately if you experience any of the following symptoms:  Sore Throat  Shortness of Breath or difficulty breathing  Cough  Chills  Body soreness or muscle pain  Headache  Fever  New loss of taste or smell  Do not arrive for your surgery ill.  Your procedure will need to be rescheduled to another time.  You will need to call your physician before the day of surgery to avoid any unnecessary exposure to hospital staff as well as other patients.

## 2022-05-14 ENCOUNTER — LAB (OUTPATIENT)
Dept: LAB | Facility: HOSPITAL | Age: 52
End: 2022-05-14

## 2022-05-14 LAB — SARS-COV-2 ORF1AB RESP QL NAA+PROBE: NOT DETECTED

## 2022-05-14 PROCEDURE — U0004 COV-19 TEST NON-CDC HGH THRU: HCPCS

## 2022-05-14 PROCEDURE — C9803 HOPD COVID-19 SPEC COLLECT: HCPCS

## 2022-05-17 ENCOUNTER — ANESTHESIA EVENT (OUTPATIENT)
Dept: PERIOP | Facility: HOSPITAL | Age: 52
End: 2022-05-17

## 2022-05-17 ENCOUNTER — ANESTHESIA (OUTPATIENT)
Dept: PERIOP | Facility: HOSPITAL | Age: 52
End: 2022-05-17

## 2022-05-17 ENCOUNTER — HOSPITAL ENCOUNTER (OUTPATIENT)
Facility: HOSPITAL | Age: 52
Setting detail: HOSPITAL OUTPATIENT SURGERY
Discharge: HOME | End: 2022-05-17
Attending: ORTHOPAEDIC SURGERY | Admitting: ORTHOPAEDIC SURGERY

## 2022-05-17 VITALS
HEART RATE: 71 BPM | BODY MASS INDEX: 64.67 KG/M2 | TEMPERATURE: 98.5 F | WEIGHT: 293 LBS | RESPIRATION RATE: 18 BRPM | DIASTOLIC BLOOD PRESSURE: 94 MMHG | SYSTOLIC BLOOD PRESSURE: 137 MMHG | OXYGEN SATURATION: 95 %

## 2022-05-17 DIAGNOSIS — M23.204 DEGENERATIVE TEAR OF LEFT MEDIAL MENISCUS: Primary | ICD-10-CM

## 2022-05-17 LAB
B-HCG UR QL: NEGATIVE
EXPIRATION DATE: NORMAL
INTERNAL NEGATIVE CONTROL: NORMAL
INTERNAL POSITIVE CONTROL: NORMAL
Lab: NORMAL

## 2022-05-17 PROCEDURE — 25010000002 DEXAMETHASONE PER 1 MG: Performed by: NURSE ANESTHETIST, CERTIFIED REGISTERED

## 2022-05-17 PROCEDURE — 25010000002 ROPIVACAINE PER 1 MG: Performed by: ANESTHESIOLOGY

## 2022-05-17 PROCEDURE — 25010000002 ONDANSETRON PER 1 MG: Performed by: NURSE ANESTHETIST, CERTIFIED REGISTERED

## 2022-05-17 PROCEDURE — 25010000002 FENTANYL CITRATE (PF) 50 MCG/ML SOLUTION: Performed by: ANESTHESIOLOGY

## 2022-05-17 PROCEDURE — 81025 URINE PREGNANCY TEST: CPT | Performed by: ORTHOPAEDIC SURGERY

## 2022-05-17 PROCEDURE — 76942 ECHO GUIDE FOR BIOPSY: CPT | Performed by: ORTHOPAEDIC SURGERY

## 2022-05-17 PROCEDURE — 25010000002 KETOROLAC TROMETHAMINE PER 15 MG: Performed by: NURSE ANESTHETIST, CERTIFIED REGISTERED

## 2022-05-17 PROCEDURE — 25010000002 PROPOFOL 10 MG/ML EMULSION: Performed by: NURSE ANESTHETIST, CERTIFIED REGISTERED

## 2022-05-17 PROCEDURE — 25010000002 SUCCINYLCHOLINE PER 20 MG: Performed by: NURSE ANESTHETIST, CERTIFIED REGISTERED

## 2022-05-17 PROCEDURE — 25010000002 FENTANYL CITRATE (PF) 50 MCG/ML SOLUTION: Performed by: NURSE ANESTHETIST, CERTIFIED REGISTERED

## 2022-05-17 PROCEDURE — 25010000002 MIDAZOLAM PER 1 MG: Performed by: ANESTHESIOLOGY

## 2022-05-17 RX ORDER — ROPIVACAINE HYDROCHLORIDE 5 MG/ML
INJECTION, SOLUTION EPIDURAL; INFILTRATION; PERINEURAL
Status: COMPLETED | OUTPATIENT
Start: 2022-05-17 | End: 2022-05-17

## 2022-05-17 RX ORDER — SCOLOPAMINE TRANSDERMAL SYSTEM 1 MG/1
1 PATCH, EXTENDED RELEASE TRANSDERMAL ONCE
Status: DISCONTINUED | OUTPATIENT
Start: 2022-05-17 | End: 2022-05-17 | Stop reason: HOSPADM

## 2022-05-17 RX ORDER — LABETALOL HYDROCHLORIDE 5 MG/ML
5 INJECTION, SOLUTION INTRAVENOUS
Status: DISCONTINUED | OUTPATIENT
Start: 2022-05-17 | End: 2022-05-17 | Stop reason: HOSPADM

## 2022-05-17 RX ORDER — ONDANSETRON 2 MG/ML
INJECTION INTRAMUSCULAR; INTRAVENOUS AS NEEDED
Status: DISCONTINUED | OUTPATIENT
Start: 2022-05-17 | End: 2022-05-17 | Stop reason: SURG

## 2022-05-17 RX ORDER — DIPHENHYDRAMINE HCL 25 MG
25 CAPSULE ORAL
Status: DISCONTINUED | OUTPATIENT
Start: 2022-05-17 | End: 2022-05-17 | Stop reason: HOSPADM

## 2022-05-17 RX ORDER — PROMETHAZINE HYDROCHLORIDE 25 MG/1
25 SUPPOSITORY RECTAL ONCE AS NEEDED
Status: DISCONTINUED | OUTPATIENT
Start: 2022-05-17 | End: 2022-05-17 | Stop reason: HOSPADM

## 2022-05-17 RX ORDER — BUPIVACAINE HYDROCHLORIDE 5 MG/ML
INJECTION, SOLUTION EPIDURAL; INTRACAUDAL AS NEEDED
Status: DISCONTINUED | OUTPATIENT
Start: 2022-05-17 | End: 2022-05-17 | Stop reason: HOSPADM

## 2022-05-17 RX ORDER — SODIUM CHLORIDE, SODIUM LACTATE, POTASSIUM CHLORIDE, CALCIUM CHLORIDE 600; 310; 30; 20 MG/100ML; MG/100ML; MG/100ML; MG/100ML
9 INJECTION, SOLUTION INTRAVENOUS CONTINUOUS PRN
Status: DISCONTINUED | OUTPATIENT
Start: 2022-05-17 | End: 2022-05-17 | Stop reason: HOSPADM

## 2022-05-17 RX ORDER — SUCCINYLCHOLINE CHLORIDE 20 MG/ML
INJECTION INTRAMUSCULAR; INTRAVENOUS AS NEEDED
Status: DISCONTINUED | OUTPATIENT
Start: 2022-05-17 | End: 2022-05-17 | Stop reason: SURG

## 2022-05-17 RX ORDER — PROPOFOL 10 MG/ML
VIAL (ML) INTRAVENOUS AS NEEDED
Status: DISCONTINUED | OUTPATIENT
Start: 2022-05-17 | End: 2022-05-17 | Stop reason: SURG

## 2022-05-17 RX ORDER — SODIUM CHLORIDE 0.9 % (FLUSH) 0.9 %
3-10 SYRINGE (ML) INJECTION AS NEEDED
Status: DISCONTINUED | OUTPATIENT
Start: 2022-05-17 | End: 2022-05-17 | Stop reason: HOSPADM

## 2022-05-17 RX ORDER — HYDROCODONE BITARTRATE AND ACETAMINOPHEN 7.5; 325 MG/1; MG/1
1-2 TABLET ORAL EVERY 6 HOURS PRN
Qty: 20 TABLET | Refills: 0 | Status: SHIPPED | OUTPATIENT
Start: 2022-05-17

## 2022-05-17 RX ORDER — DIPHENHYDRAMINE HYDROCHLORIDE 50 MG/ML
12.5 INJECTION INTRAMUSCULAR; INTRAVENOUS
Status: DISCONTINUED | OUTPATIENT
Start: 2022-05-17 | End: 2022-05-17 | Stop reason: HOSPADM

## 2022-05-17 RX ORDER — HYDRALAZINE HYDROCHLORIDE 20 MG/ML
5 INJECTION INTRAMUSCULAR; INTRAVENOUS
Status: DISCONTINUED | OUTPATIENT
Start: 2022-05-17 | End: 2022-05-17 | Stop reason: HOSPADM

## 2022-05-17 RX ORDER — EPHEDRINE SULFATE 50 MG/ML
5 INJECTION, SOLUTION INTRAVENOUS ONCE AS NEEDED
Status: DISCONTINUED | OUTPATIENT
Start: 2022-05-17 | End: 2022-05-17 | Stop reason: HOSPADM

## 2022-05-17 RX ORDER — FENTANYL CITRATE 50 UG/ML
50 INJECTION, SOLUTION INTRAMUSCULAR; INTRAVENOUS
Status: DISCONTINUED | OUTPATIENT
Start: 2022-05-17 | End: 2022-05-17 | Stop reason: HOSPADM

## 2022-05-17 RX ORDER — ACETAMINOPHEN 500 MG
1000 TABLET ORAL ONCE
Status: COMPLETED | OUTPATIENT
Start: 2022-05-17 | End: 2022-05-17

## 2022-05-17 RX ORDER — LIDOCAINE HYDROCHLORIDE 20 MG/ML
INJECTION, SOLUTION INFILTRATION; PERINEURAL AS NEEDED
Status: DISCONTINUED | OUTPATIENT
Start: 2022-05-17 | End: 2022-05-17 | Stop reason: SURG

## 2022-05-17 RX ORDER — ONDANSETRON 4 MG/1
4 TABLET, FILM COATED ORAL EVERY 8 HOURS PRN
Qty: 10 TABLET | Refills: 0 | Status: SHIPPED | OUTPATIENT
Start: 2022-05-17

## 2022-05-17 RX ORDER — ONDANSETRON 2 MG/ML
4 INJECTION INTRAMUSCULAR; INTRAVENOUS ONCE AS NEEDED
Status: DISCONTINUED | OUTPATIENT
Start: 2022-05-17 | End: 2022-05-17 | Stop reason: HOSPADM

## 2022-05-17 RX ORDER — MIDAZOLAM HYDROCHLORIDE 1 MG/ML
2 INJECTION INTRAMUSCULAR; INTRAVENOUS
Status: DISCONTINUED | OUTPATIENT
Start: 2022-05-17 | End: 2022-05-17 | Stop reason: HOSPADM

## 2022-05-17 RX ORDER — ROCURONIUM BROMIDE 10 MG/ML
INJECTION, SOLUTION INTRAVENOUS AS NEEDED
Status: DISCONTINUED | OUTPATIENT
Start: 2022-05-17 | End: 2022-05-17 | Stop reason: SURG

## 2022-05-17 RX ORDER — DEXAMETHASONE SODIUM PHOSPHATE 10 MG/ML
INJECTION INTRAMUSCULAR; INTRAVENOUS AS NEEDED
Status: DISCONTINUED | OUTPATIENT
Start: 2022-05-17 | End: 2022-05-17 | Stop reason: SURG

## 2022-05-17 RX ORDER — FAMOTIDINE 10 MG/ML
20 INJECTION, SOLUTION INTRAVENOUS ONCE
Status: COMPLETED | OUTPATIENT
Start: 2022-05-17 | End: 2022-05-17

## 2022-05-17 RX ORDER — ASPIRIN 81 MG/1
81 TABLET ORAL 2 TIMES DAILY
Qty: 20 TABLET | Refills: 0 | Status: SHIPPED | OUTPATIENT
Start: 2022-05-18

## 2022-05-17 RX ORDER — HYDROCODONE BITARTRATE AND ACETAMINOPHEN 7.5; 325 MG/1; MG/1
1 TABLET ORAL ONCE AS NEEDED
Status: COMPLETED | OUTPATIENT
Start: 2022-05-17 | End: 2022-05-17

## 2022-05-17 RX ORDER — PROMETHAZINE HYDROCHLORIDE 25 MG/1
25 TABLET ORAL ONCE AS NEEDED
Status: DISCONTINUED | OUTPATIENT
Start: 2022-05-17 | End: 2022-05-17 | Stop reason: HOSPADM

## 2022-05-17 RX ORDER — OXYCODONE AND ACETAMINOPHEN 7.5; 325 MG/1; MG/1
1 TABLET ORAL EVERY 4 HOURS PRN
Status: DISCONTINUED | OUTPATIENT
Start: 2022-05-17 | End: 2022-05-17 | Stop reason: HOSPADM

## 2022-05-17 RX ORDER — FLUMAZENIL 0.1 MG/ML
0.2 INJECTION INTRAVENOUS AS NEEDED
Status: DISCONTINUED | OUTPATIENT
Start: 2022-05-17 | End: 2022-05-17 | Stop reason: HOSPADM

## 2022-05-17 RX ORDER — FENTANYL CITRATE 50 UG/ML
INJECTION, SOLUTION INTRAMUSCULAR; INTRAVENOUS
Status: COMPLETED | OUTPATIENT
Start: 2022-05-17 | End: 2022-05-17

## 2022-05-17 RX ORDER — HYDROMORPHONE HYDROCHLORIDE 1 MG/ML
0.5 INJECTION, SOLUTION INTRAMUSCULAR; INTRAVENOUS; SUBCUTANEOUS
Status: DISCONTINUED | OUTPATIENT
Start: 2022-05-17 | End: 2022-05-17 | Stop reason: HOSPADM

## 2022-05-17 RX ORDER — SODIUM CHLORIDE, SODIUM LACTATE, POTASSIUM CHLORIDE, AND CALCIUM CHLORIDE .6; .31; .03; .02 G/100ML; G/100ML; G/100ML; G/100ML
IRRIGANT IRRIGATION AS NEEDED
Status: DISCONTINUED | OUTPATIENT
Start: 2022-05-17 | End: 2022-05-17 | Stop reason: HOSPADM

## 2022-05-17 RX ORDER — IBUPROFEN 600 MG/1
600 TABLET ORAL ONCE AS NEEDED
Status: DISCONTINUED | OUTPATIENT
Start: 2022-05-17 | End: 2022-05-17 | Stop reason: HOSPADM

## 2022-05-17 RX ORDER — FENTANYL CITRATE 50 UG/ML
INJECTION, SOLUTION INTRAMUSCULAR; INTRAVENOUS AS NEEDED
Status: DISCONTINUED | OUTPATIENT
Start: 2022-05-17 | End: 2022-05-17 | Stop reason: SURG

## 2022-05-17 RX ORDER — KETOROLAC TROMETHAMINE 30 MG/ML
INJECTION, SOLUTION INTRAMUSCULAR; INTRAVENOUS AS NEEDED
Status: DISCONTINUED | OUTPATIENT
Start: 2022-05-17 | End: 2022-05-17 | Stop reason: SURG

## 2022-05-17 RX ORDER — CLINDAMYCIN PHOSPHATE 900 MG/50ML
900 INJECTION INTRAVENOUS
Status: COMPLETED | OUTPATIENT
Start: 2022-05-17 | End: 2022-05-17

## 2022-05-17 RX ORDER — SODIUM CHLORIDE 0.9 % (FLUSH) 0.9 %
3 SYRINGE (ML) INJECTION EVERY 12 HOURS SCHEDULED
Status: DISCONTINUED | OUTPATIENT
Start: 2022-05-17 | End: 2022-05-17 | Stop reason: HOSPADM

## 2022-05-17 RX ORDER — NALOXONE HCL 0.4 MG/ML
0.2 VIAL (ML) INJECTION AS NEEDED
Status: DISCONTINUED | OUTPATIENT
Start: 2022-05-17 | End: 2022-05-17 | Stop reason: HOSPADM

## 2022-05-17 RX ORDER — MIDAZOLAM HYDROCHLORIDE 1 MG/ML
INJECTION INTRAMUSCULAR; INTRAVENOUS
Status: COMPLETED | OUTPATIENT
Start: 2022-05-17 | End: 2022-05-17

## 2022-05-17 RX ADMIN — FENTANYL CITRATE 50 MCG: 0.05 INJECTION, SOLUTION INTRAMUSCULAR; INTRAVENOUS at 06:13

## 2022-05-17 RX ADMIN — ROCURONIUM BROMIDE 5 MG: 50 INJECTION INTRAVENOUS at 07:14

## 2022-05-17 RX ADMIN — CLINDAMYCIN IN 5 PERCENT DEXTROSE 900 MG: 18 INJECTION, SOLUTION INTRAVENOUS at 07:01

## 2022-05-17 RX ADMIN — ACETAMINOPHEN 1000 MG: 500 TABLET, FILM COATED ORAL at 06:15

## 2022-05-17 RX ADMIN — SUCCINYLCHOLINE CHLORIDE 200 MG: 20 INJECTION, SOLUTION INTRAMUSCULAR; INTRAVENOUS; PARENTERAL at 07:14

## 2022-05-17 RX ADMIN — ONDANSETRON 4 MG: 2 INJECTION INTRAMUSCULAR; INTRAVENOUS at 07:55

## 2022-05-17 RX ADMIN — SCOPALAMINE 1 PATCH: 1 PATCH, EXTENDED RELEASE TRANSDERMAL at 06:14

## 2022-05-17 RX ADMIN — LIDOCAINE HYDROCHLORIDE 100 MG: 20 INJECTION, SOLUTION INFILTRATION; PERINEURAL at 07:14

## 2022-05-17 RX ADMIN — MIDAZOLAM 2 MG: 1 INJECTION INTRAMUSCULAR; INTRAVENOUS at 06:29

## 2022-05-17 RX ADMIN — MIDAZOLAM 2 MG: 1 INJECTION INTRAMUSCULAR; INTRAVENOUS at 06:13

## 2022-05-17 RX ADMIN — ROPIVACAINE HYDROCHLORIDE 30 ML: 5 INJECTION, SOLUTION EPIDURAL; INFILTRATION; PERINEURAL at 06:18

## 2022-05-17 RX ADMIN — PROPOFOL 250 MG: 10 INJECTION, EMULSION INTRAVENOUS at 07:14

## 2022-05-17 RX ADMIN — FENTANYL CITRATE 100 MCG: 0.05 INJECTION, SOLUTION INTRAMUSCULAR; INTRAVENOUS at 07:11

## 2022-05-17 RX ADMIN — DEXAMETHASONE SODIUM PHOSPHATE 8 MG: 10 INJECTION INTRAMUSCULAR; INTRAVENOUS at 07:23

## 2022-05-17 RX ADMIN — FAMOTIDINE 20 MG: 10 INJECTION INTRAVENOUS at 06:16

## 2022-05-17 RX ADMIN — KETOROLAC TROMETHAMINE 30 MG: 30 INJECTION, SOLUTION INTRAMUSCULAR at 07:55

## 2022-05-17 RX ADMIN — HYDROCODONE BITARTRATE AND ACETAMINOPHEN 1 TABLET: 7.5; 325 TABLET ORAL at 08:42

## 2022-05-17 RX ADMIN — SODIUM CHLORIDE, POTASSIUM CHLORIDE, SODIUM LACTATE AND CALCIUM CHLORIDE 9 ML/HR: 600; 310; 30; 20 INJECTION, SOLUTION INTRAVENOUS at 07:01

## 2022-05-17 NOTE — ANESTHESIA PROCEDURE NOTES
Airway  Urgency: elective    Date/Time: 5/17/2022 7:16 AM  Airway not difficult    General Information and Staff    Patient location during procedure: OR  Anesthesiologist: Yo Barbosa MD  CRNA/CAA: Jose Luis Nicolas CRNA    Indications and Patient Condition  Indications for airway management: airway protection    Preoxygenated: yes  MILS maintained throughout  Mask difficulty assessment: 2 - vent by mask + OA or adjuvant +/- NMBA    Final Airway Details  Final airway type: endotracheal airway      Successful airway: ETT  Cuffed: yes   Successful intubation technique: direct laryngoscopy  Facilitating devices/methods: intubating stylet  Endotracheal tube insertion site: oral  Blade: [place]  Blade size: 2  ETT size (mm): 7.5  Cormack-Lehane Classification: grade IIa - partial view of glottis  Placement verified by: chest auscultation and capnometry   Cuff volume (mL): 8  Measured from: lips  ETT/EBT  to lips (cm): 21  Number of attempts at approach: 1  Assessment: lips, teeth, and gum same as pre-op and atraumatic intubation

## 2022-05-17 NOTE — ANESTHESIA POSTPROCEDURE EVALUATION
Patient: Maria Esther Donahue    Procedure Summary     Date: 05/17/22 Room / Location: [facility] OR [address] [place] [address] MAIN OR    Anesthesia Start: 0705 Anesthesia Stop: 0817    Procedure: LEFT KNEE ARTHROSCOPIC PARTIAL MEDIAL MENISCECTOMY (Left Knee) Diagnosis:     Surgeons: Maldonado Au MD Provider: Yo Barbosa MD    Anesthesia Type: general ASA Status: 3          Anesthesia Type: general    Vitals  Vitals Value Taken Time   /84 05/17/22 0846   Temp 36.9 °C (98.5 °F) 05/17/22 0845   Pulse 67 05/17/22 0851   Resp 18 05/17/22 0845   SpO2 95 % 05/17/22 0852   Vitals shown include unvalidated device data.        Post Anesthesia Care and Evaluation    Patient location during evaluation: bedside  Patient participation: complete - patient participated  Level of consciousness: awake  Pain management: adequate  Airway patency: patent  Anesthetic complications: No anesthetic complications    Cardiovascular status: acceptable  Respiratory status: acceptable  Hydration status: acceptable    Comments: */94 (BP Location: Left arm, Patient Position: Lying)   Pulse 71   Temp 36.9 °C (98.5 °F) (Oral)   Resp 18   Wt (!) 150 kg (331 lb 2.1 oz)   SpO2 95%   BMI 64.67 kg/m²

## 2022-05-17 NOTE — H&P
Orthopaedic H&P      Patient: Maria Esther Donahue    Date of Admission: 5/17/2022  5:07 AM    YOB: 1970    Medical Record Number: [id number]    Attending Physician:  Maldonado Au MD    Chief Complaints: Left knee medial meniscus tear.      History of Present Illness: 51 y.o. female admitted to [facility] with left knee medial meniscus tear.  She is here for a left knee arthroscopic partial medial meniscectomy.  Onset of symptoms was abrupt starting several months ago.  Symptoms are associated with left knee pain.  Symptoms are aggravated by weightbearing.   Symptoms improve with rest.     Allergies:   Allergies   Allergen Reactions   • Diclofenac Sodium Dizziness, Shortness Of Breath, Swelling and Urinary Retention   • Ibuprofen Dizziness, GI Intolerance, Shortness Of Breath, Swelling and Urinary Retention   • Peanut Oil Anaphylaxis, Itching, Nausea And Vomiting and Rash   • Penicillins Hives and Shortness Of Breath   • Codeine Nausea And Vomiting and Rash       Medications:   Home Medications:  Medications Prior to Admission   Medication Sig Dispense Refill Last Dose   • acetaminophen (TYLENOL) 500 MG tablet Take 500 mg by mouth Every 6 (Six) Hours As Needed for Mild Pain .   Past Month at Unknown time   • levothyroxine (SYNTHROID, LEVOTHROID) 150 MCG tablet Take 150 mcg by mouth Every Morning.   5/17/2022 at 0300   • MAGNESIUM MALATE PO Take 3,750 mg by mouth Every Evening.   5/16/2022 at 2000   • nebivolol (BYSTOLIC) 10 MG tablet Take 10 mg by mouth Every Evening.   5/16/2022 at 2000   • vitamin D (ERGOCALCIFEROL) 1.25 MG (11851 UT) capsule capsule Take 50,000 Units by mouth 1 (One) Time Per Week. TAKES ON SAT   5/14/2022   • vitamin D3 125 MCG (5000 UT) capsule capsule Take 5,000 Units by mouth Every Evening.   5/16/2022 at 2000   • albuterol sulfate HFA (Ventolin HFA) 108 (90 Base) MCG/ACT inhaler Inhale 2 puffs Every 4 (Four) Hours As Needed for Wheezing or Shortness of Air. 18 g 0  More than a month at Unknown time   • fluticasone (FLONASE) 50 MCG/ACT nasal spray 1 spray into the nostril(s) as directed by provider 2 (Two) Times a Day. (Patient taking differently: 1 spray by Each Nare route 2 (Two) Times a Day As Needed.) 1 bottle 5 More than a month at Unknown time   • gabapentin (NEURONTIN) 100 MG capsule Take 100 mg by mouth At Night As Needed.   5/15/2022   • lisdexamfetamine (VYVANSE) 50 MG capsule Take 50 mg by mouth Daily As Needed PLANNING   TO HOLD 48 HOURS PRIOR TO OR   More than a month at Unknown time   • promethazine (PHENERGAN) 25 MG tablet Take 25 mg by mouth Every 6 (Six) Hours As Needed.   More than a month at Unknown time   • tiZANidine (ZANAFLEX) 4 MG tablet Take 1 tablet by mouth Daily As Needed.   5/15/2022       Current Medications:  Scheduled Meds:clindamycin, 900 mg, Intravenous, On Call  Scopolamine, 1 patch, Transdermal, Once  sodium chloride, 3 mL, Intravenous, Q12H      Continuous Infusions:lactated ringers, 9 mL/hr      PRN Meds:.fentanyl  •  lactated ringers  •  midazolam  •  sodium chloride    Past Medical History:   Diagnosis Date   • ADHD (attention deficit hyperactivity disorder)    • Allergic 1981    PCN,CODEINE; MULTIPLE Environmental and food   • Anesthesia     PATIENT IS EXTREMELY ANXIOUS BEFORE SURGERY   • Ankle sprain 06/21/2019   • Anxiety    • Arthritis    • Asthma    • Brain concussion     Multi last 2016   • Cholelithiasis 02/01/2019    Gallbladder removed 2/2/2019   • Foot sprain     Multi bilateral   • Hashimoto's disease    • Heart murmur 1984    Pulmonary ejection murmur   • History of bulimia     teenage years/young adult   • HL (hearing loss) 2013    Undiagnosed   • Hypothyroidism    • Insomnia    • Irritable bowel syndrome 1992    Not being treated   • Left knee pain    • Low back pain 08/2016    Multiple falls   • Obesity    • PONV (postoperative nausea and vomiting)    • Urinary tract infection 1994    Multiple over years   • Visual  impairment 1973     Past Surgical History:   Procedure Laterality Date   • BREAST AUGMENTATION Bilateral ,     Dr. Eng   • BREAST SURGERY  ,    Implants; implants replaced and lift   • CHOLECYSTECTOMY  2019   • CHOLECYSTECTOMY WITH INTRAOPERATIVE CHOLANGIOGRAM N/A 2019    Procedure: CHOLECYSTECTOMY LAPAROSCOPIC;  Surgeon: Cruz Grant MD;  Location: [facility] OR;  Service: General   • COSMETIC SURGERY  ;    Liposuction...bilateral thighs, hips, buttocks 8   • LIPOSUCTION N/A    • UMBILICAL HERNIA REPAIR N/A     Dr. Pérez     Social History     Occupational History   • Occupation: RN      Comment: Psychiatric nurse (adolescent)   Tobacco Use   • Smoking status: Former Smoker     Packs/day: 0.25     Years: 5.00     Pack years: 1.25     Types: Cigarettes     Start date: 2009     Quit date:      Years since quittin.3   • Smokeless tobacco: Never Used   Vaping Use   • Vaping Use: Former   Substance and Sexual Activity   • Alcohol use: Not Currently     Comment: rarely, 2 glasses yearly   • Drug use: No   • Sexual activity: Yes     Partners: Male     Birth control/protection: Coitus interruptus      Social History     Social History Narrative   • Not on file     Family History   Problem Relation Age of Onset   • Kidney disease Mother    • Valvular heart disease Mother    • Skin cancer Mother    • Arthritis Mother    • Cancer Mother         Skin   • Hearing loss Mother    • Heart disease Mother    • Hyperlipidemia Mother    • Osteoporosis Mother    • Alzheimer's disease Father 70   • Bipolar disorder Father    • Skin cancer Father    • Cancer Father         Skin   • Bipolar disorder Sister    • Bipolar disorder Sister    • ADD / ADHD Sister    • Cancer Sister         Skin   • Cancer Sister         Skin   • Bipolar disorder Brother    • Skin cancer Brother    • Cancer Brother         Skin   • Alzheimer's disease Paternal Aunt 70   • Cervical cancer Maternal  Grandmother    • Cancer Maternal Grandmother         Cervical cancer   • Stroke Maternal Grandfather         Stroke 1937   • Alzheimer's disease Paternal Grandmother    • Colon cancer Neg Hx    • Breast cancer Neg Hx    • Diabetes Neg Hx    • Malig Hyperthermia Neg Hx          Review of Systems:   No other pertinent positives or negatives other than what is mentioned in the HPI and below.  Constitutional: Negative for fatigue, fever, or weight loss  HEENT: No active headache.  Pulmonary: Patient denies SOA.  Cardiovascular: Patient denies any chest pain.  Gastrointestinal:  Patient denies active vomiting or diarrhea.  Musculoskeletal: Positive for left knee pain.  Neurological: Patient denies active dizziness or loss of consciousness.  Skin: Patient denies any active bleeding.    Vital signs in last 24 hours:  Temp:  [99.1 °F (37.3 °C)] 99.1 °F (37.3 °C)  Heart Rate:  [61-66] 61  Resp:  [16-21] 16  BP: (145-147)/(84) 145/84  Vitals:    05/17/22 0612 05/17/22 0619 05/17/22 0632 05/17/22 0633   BP: 147/84 145/84     BP Location:  Left arm     Patient Position:  Lying     Pulse: 63 64  61   Resp: 17 21  16   Temp:       TempSrc:       SpO2:  96%  98%   Weight:   (!) 150 kg (331 lb 2.1 oz)           Physical Exam: 51 y.o. female         General Appearance:  Alert, cooperative, in no acute distress    HEENT:    Atraumatic, Pupils are equal   Neck:   Cervical spine midline, no appreciable JVD   Lungs:     Breathing non-labored and chest rise symmetric    Heart:   Abdomen:     Rectal:    Extremities:   Pulses  Neurovascular:   Skin:  Musculoskeletal:         Pulse regular    Soft, Non-tender or distended    Deferred    No clubbing, cyanosis, or edema    Intact    Cranial nerves 2 - 12 grossly intact, sensation intact    No skin lesions  Left knee skin intact.  Normal motor and sensory exam.  Compartments are soft.  Swelling noted.     Diagnostic Tests:    Results from last 7 days   Lab Units 05/10/22  1210   WBC 10*3/mm3  10.86*   HEMOGLOBIN g/dL 13.7   HEMATOCRIT % 41.0   PLATELETS 10*3/mm3 301     Results from last 7 days   Lab Units 05/10/22  1210   SODIUM mmol/L 138   POTASSIUM mmol/L 4.4   CHLORIDE mmol/L 103   CO2 mmol/L 21.3*   BUN mg/dL 18   CREATININE mg/dL 0.85   GLUCOSE mg/dL 100*   CALCIUM mg/dL 9.7             Assessment:  Patient Active Problem List   Diagnosis   • Asthma   • Environmental and seasonal allergies   • Morbid obesity (HCC)   • Attention deficit hyperactivity disorder (ADHD), combined type   • Chronic left-sided low back pain   • Chronic insomnia   • Hypothyroidism due to Hashimoto's thyroiditis   • Sprain of left ankle         Plan:  The patient voiced understanding of the risks, benefits, and alternative forms of treatment that were discussed and the patient consents to proceed with left knee arthroscopic partial medial meniscectomy as planned.  No changes.  All questions answered.     Date: 5/17/2022  Maldonado Au MD

## 2022-05-17 NOTE — ANESTHESIA PROCEDURE NOTES
Peripheral Block      Patient reassessed immediately prior to procedure    Patient location during procedure: holding area  Start time: 5/17/2022 6:12 AM  Stop time: 5/17/2022 6:19 AM  Reason for block: at surgeon's request and post-op pain management  Performed by  Anesthesiologist: Yo Barbosa MD  Preanesthetic Checklist  Completed: patient identified, IV checked, site marked, risks and benefits discussed, surgical consent, monitors and equipment checked, pre-op evaluation and timeout performed  Prep:  Sterile barriers:cap, gloves and mask  Prep: ChloraPrep  Patient monitoring: blood pressure monitoring, continuous pulse oximetry and EKG  Procedure    Sedation: yes    Guidance:ultrasound guided    ULTRASOUND INTERPRETATION.  Using ultrasound guidance a 21 G gauge needle was placed in close proximity to the femoral nerve, at which point, under ultrasound guidance anesthetic was injected in the area of the nerve and spread of the anesthesia was seen on ultrasound in close proximity thereto.  There were no abnormalities seen on ultrasound; a digital image was taken; and the patient tolerated the procedure with no complications. Images:still images obtained, printed/placed on chart    Laterality:left  Block Type:adductor canal block (Femoral Nerve at Adductor Canal)  Injection Technique:single-shot  Needle Type:short-bevel  Needle Gauge:21 G  Loss of resistance: normal.    Medications Used: ropivacaine (NAROPIN) 0.5 % injection, 30 mL  Med administered at 5/17/2022 6:18 AM      Medications  Comment:Ultrasound interpretation: ultrasound guidance utilized for visualization of needle approach to nerve/artery and verification of local anesthetic spread to surrounding area. Photo printed and placed on chart for record.    Post Assessment  Injection Assessment: negative aspiration for heme, no paresthesia on injection and incremental injection  Patient Tolerance:comfortable throughout  block  Complications:no

## 2022-05-17 NOTE — ANESTHESIA PREPROCEDURE EVALUATION
Anesthesia Evaluation     history of anesthetic complications: PONV  NPO Solid Status: > 8 hours  NPO Liquid Status: > 2 hours           Airway   Mallampati: II  Neck ROM: full  no difficulty expected  Dental - normal exam     Pulmonary - normal exam   (+) a smoker Former, asthma,  (-) COPD, sleep apnea    PE comment: nonlabored  Cardiovascular - normal exam    Rhythm: regular  Rate: normal    (+) valvular problems/murmurs murmur,   (-) hypertension, past MI, CAD, dysrhythmias, angina      Neuro/Psych  (+) psychiatric history Anxiety and ADHD,    (-) seizures, TIA, CVA    ROS Comment: Multiple concussions  GI/Hepatic/Renal/Endo    (+) morbid obesity,  thyroid problem (Hashimoto's thyroiditis) hypothyroidism  (-) GERD, liver disease, no renal disease, diabetes    Musculoskeletal     (+) arthralgias, back pain, chronic pain,   Abdominal    Substance History      OB/GYN          Other   arthritis,                      Anesthesia Plan    ASA 3     general   (AC block for post-op pain PSR)  intravenous induction     Anesthetic plan, all risks, benefits, and alternatives have been provided, discussed and informed consent has been obtained with: patient.        CODE STATUS:

## 2022-05-17 NOTE — OP NOTE
KNEE ARTHROSCOPY  Procedure Note    Maria Esther Donahue  5/17/2022    Pre-op Diagnosis: Left knee medial meniscus tear.   Post-op Diagnosis: Same  Procedure: Left knee arthroscopic partial medial meniscectomy, [address].  Surgeon:  Maldonado Au MD  Assistant: KAREN Zuniga  Anesthesia: General with Block, Anesthesiologist: Yo Barbosa MD  CRNA: Jose Luis Nicolas CRNA  Staff: Circulator: Sarah Cervantes RN  Scrub Person: Rosa M Taylor  Assistant: Kevin Tristan APRN CFA  Estimated Blood Loss: minimal  Specimens: * No orders in the log *  Drains: none  Complications: None    Components Utilized:    Nothing was implanted during the procedure    Indication for Procedure:  This patient is a 51 y.o. female who presented with severe left knee pain to my office.  The patient tried activity modification, corticosteroid injection, and therapy exercises without improvement.  MRI was performed confirming medial meniscus tear.  This was consistent with her symptoms.  Due to failure despite a conservative treatment, surgical intervention with left knee arthroscopy and partial medial meniscectomy was offered to the patient.  The patient wished to proceed with surgical intervention.      The risks and benefits of surgery were discussed with patient and informed consent was obtained.  Risks include but are not limited to, infection, bleeding, nerve injury, blood clots, risks associated with anesthesia, need for further surgery, persistent pain, and possibly death.    Protocols for intravenous antibiotics and venous thrombosis were followed for this patient.  IV antibiotics were infused prior to surgery and will be discontinued within 24 hours of completion of the surgical procedure.       DESCRIPTION OF PROCEDURE:     The patient was seen in Preoperative Holding Area where their surgical site was marked. Preoperative antibiotics were received. H&P and consent updated.  A preoperative abductor canal block was  performed to aid in pain control.  She was taken to the Operating Room and provided general anesthesia on the Operating Room table.  The left lower extremity was prepped and draped in a typical sterile fashion.  A timeout was performed confirming the correct surgical site and procedure.  Based on body habitus, no tourniquet was utilized.  We performed the arthroscopy supine and flexed the knee over the side of the bed.  My assistant stabilized the thigh where the procedure.  At this point, an anterolateral portal was created with 11 blade.  Blunt trocar carefully inserted into the knee joint.  Anterior medial portal was created under visual guidance using an 18-gauge needle.  Full inventory the knee was performed.  Patellofemoral joint tracked appropriately.  Articular cartilage was otherwise intact but 1 small bleb was noted on the trochlea.  No full-thickness cartilage loss.  No loose bodies appreciated within the joint.  The medial compartment was evaluated.  There is a complex medial meniscal body tear extending into the posterior region of the joint.  There was some grade 3 changes to the femoral weightbearing surface.  Grade 2 changes to the tibial side.  Intercondylar notch was normal.  Lateral compartment was normal.    Focus was now placed on the medial compartment.  Articular cartilage was smoothed using a 4.0 mm shaver.  Images were taken of this.  The meniscus was then focused on.  The arthroscopic biters were used to resect the torn portion of the meniscus.  The shaver was used to smooth out the meniscus.  This was debrided from the posterior horn around to the mid body.  Before-and-after images were taken confirming partial medial meniscectomy.  The residual meniscus was intact.  There was possibly a small oblique tear along the posterior third.  However, I did not want to resect any further and destabilize the meniscus.  Final images were taken.  Instruments were removed from the knee.  The portal  wounds were closed with 3-0 nylon suture.  Approximately 20 cc of Marcaine half percent without epinephrine was injected into the joint.  Xeroform, 4 x 4's, ABD pad, cast padding, and Ace bandage were placed.  The patient was subsequently awakened from general anesthesia in stable condition.  She was taken to the PACU postoperatively.  Sponge and needle counts were appropriate.    Postoperative Plan:  The patient will be discharged home.  Should be weightbearing as tolerated with range of motion as tolerated.  She will follow-up in the office in 7 to 10 days.  She will have her sutures removed and start physical therapy.  She will ice and elevate.  She will use a walker to improve ambulation.  She will be discharged on aspirin 81 mg p.o. twice daily for 10 days for DVT prophylaxis.    No complications were encountered during the surgical procedure.    Maldonado uA MD

## 2022-05-17 NOTE — DISCHARGE INSTRUCTIONS
Scopolamine Patch  This patch has been applied to the skin behind one of your ears.  It may stay in place up to 24 hours. You may remove it at any time after your surgery; however, it should be removed after you are up and walking around the next day.  This medicine reduces stomach upset. Side effects may include: dry mouth, dizziness, sleepiness, constipation, or upset stomach.  An allergy would show up as: a rash, itching, wheezing or shortness of breath.  Follow these instructions:  Do not drink alcohol, drive or operate machinery while taking this medicine.  Wear only 1 patch at a time. You can leave the patch on for up to 24 hours.  When you remove the patch, fold it in half with the sticky sides together and throw it away. Wash your hands and the area under the patch.  Do not touch your eye with your hand if it has touched the patch.  Wash your hands well before and after touching the patch.  Sit or stand slowly to avoid dizziness.  Call your doctor if you have:  Any sign of allergy  No relief  Trouble passing urine  Any new or severe symptoms             What to expect after a Nerve Block    Nerve blocks administered to block pain affect many types of nerves, including those nerves that control movement, pain, and normal sensation. Following a nerve block, you may notice some bruising at the site where the block was given. You may experience sensations such as: numbness of the affected area or limb, tingling, heaviness (that is the limb feels heavy to you), weakness or inability to move the affected arm or leg, or a feeling as if your arm or leg has “fallen asleep.”     A nerve block can last from 2 to 36 hours depending on the medications used.  Usually the weakness wears off first followed by the tingling and heaviness. As the block wears off, you may begin to notice pain; however, this sequence of events may occur in any order. Typically, you will be able to move your limb before you will feel it. Once a  nerve block begins to wear off, the effects are usually completely gone within 60 minutes.  If you experience continued side effects that you believe are block related for longer than 48 hours, please call your healthcare provider. Please see block-specific instructions below.    Instructions for any Block involving the leg/foot:   If you have had a leg /foot block, you should not bear weight on the affected leg until the block has worn off. After the block has worn off, weight bearing should be as directed by your surgeon. You may be sent home with crutches. You are at high risk for falling because of the anesthetic effects on your leg. Please use caution when standing or trying to move or walk. Have someone assist you until your leg and foot function have returned to normal.     Protection of a “blocked” limb  After a nerve block, you cannot feel pain, pressure, or extremes of temperature in the affected limb. And because of this, your blocked limb is at more risk for injury. For example, it is possible to burn your limb on an extremely hot surface without feeling it.     When resting, it is important to reposition your limb periodically to avoid prolonged pressure on it. This may require the use of pillows and padding.    While sleeping, you should avoid rolling onto the affected limb or putting too much pressure on it.     If you have a cast or tight dressing, check the color of your fingers or toes of the affected limb. Call your surgeon if they look discolored (that is, dusky, dark colored).    Use caution in cold weather. Cover your limb appropriately to protect it from the cold.    Pain Management:  Your surgeon will give you a prescription for pain medication. Begin taking this before the nerve block wears off. Bear in mind that sometimes the block can wear off in the middle of the night.

## 2022-07-05 ENCOUNTER — TRANSCRIBE ORDERS (OUTPATIENT)
Dept: ADMINISTRATIVE | Facility: HOSPITAL | Age: 52
End: 2022-07-05

## 2022-07-05 DIAGNOSIS — R59.9 ENLARGED LYMPH NODES: Primary | ICD-10-CM

## 2022-07-26 ENCOUNTER — HOSPITAL ENCOUNTER (OUTPATIENT)
Dept: CT IMAGING | Facility: HOSPITAL | Age: 52
Discharge: HOME | End: 2022-07-26
Admitting: NURSE PRACTITIONER

## 2022-07-26 DIAGNOSIS — R59.9 ENLARGED LYMPH NODES: ICD-10-CM

## 2022-07-26 PROCEDURE — 82565 ASSAY OF CREATININE: CPT

## 2022-07-26 PROCEDURE — 71260 CT THORAX DX C+: CPT

## 2022-07-26 PROCEDURE — 25010000002 IOPAMIDOL 61 % SOLUTION: Performed by: NURSE PRACTITIONER

## 2022-07-26 RX ADMIN — IOPAMIDOL 100 ML: 612 INJECTION, SOLUTION INTRAVENOUS at 11:43

## 2022-07-27 LAB — CREAT BLDA-MCNC: 1 MG/DL (ref 0.6–1.3)

## 2023-11-11 NOTE — ASSESSMENT & PLAN NOTE
Pt to increase levothyroxine to 125 mcg and recheck thyroid labs in 2 months. JORGEJ   11-Nov-2023 17:31

## (undated) DEVICE — CATH CHOLANG 4.5F18IN BRGNDY

## (undated) DEVICE — PAD,ABDOMINAL,8"X10",ST,LF: Brand: MEDLINE

## (undated) DEVICE — GLV SURG PREMIERPRO ORTHO LTX PF SZ8 BRN

## (undated) DEVICE — STPCK 3WY D201 DISCOFIX

## (undated) DEVICE — UNDERCAST PADDING: Brand: DEROYAL

## (undated) DEVICE — PK ARTHSCP 40

## (undated) DEVICE — DRAPE,REIN 53X77,STERILE: Brand: MEDLINE

## (undated) DEVICE — ENDOPATH XCEL BLADELESS TROCARS WITH STABILITY SLEEVES: Brand: ENDOPATH XCEL

## (undated) DEVICE — GLV SURG BIOGEL LTX PF 6

## (undated) DEVICE — CATH IV INSYTE AUTOGARD 14G 1 1/2IN ORNG

## (undated) DEVICE — ENCORE® LATEX ORTHO SIZE 7.5, STERILE LATEX POWDER-FREE SURGICAL GLOVE: Brand: ENCORE

## (undated) DEVICE — MAT FLR ABSORBENT LG 4FT 10 2.5FT

## (undated) DEVICE — BLD SHV DBL/CUT COOLCUT 4MM 13CM

## (undated) DEVICE — SUT VIC 5/0 PS2 18IN J495H

## (undated) DEVICE — ADHS SKIN DERMABOND TOP ADVANCED

## (undated) DEVICE — EXTENSION SET, MALE LUER LOCK ADAPTER WITH RETRACTABLE COLLAR

## (undated) DEVICE — ENDOCUT SCISSOR TIP, DISPOSABLE: Brand: RENEW

## (undated) DEVICE — GOWN ,SIRUS,NONREINFORCED SMALL: Brand: MEDLINE

## (undated) DEVICE — LOU LAP CHOLE: Brand: MEDLINE INDUSTRIES, INC.

## (undated) DEVICE — SUT ETHLN 3/0 PSL BLK MONO SA 30IN 1691H

## (undated) DEVICE — SKIN PREP TRAY W/CHG: Brand: MEDLINE INDUSTRIES, INC.

## (undated) DEVICE — DRSNG GZ PETROLTM XEROFORM CURAD 1X8IN STRL

## (undated) DEVICE — CONTAINER,SPECIMEN,OR STERILE,4OZ: Brand: MEDLINE

## (undated) DEVICE — ENDOPATH PNEUMONEEDLE INSUFFLATION NEEDLES WITH LUER LOCK CONNECTORS 120MM: Brand: ENDOPATH

## (undated) DEVICE — NEEDLE, QUINCKE, 18GX3.5": Brand: MEDLINE

## (undated) DEVICE — GLV SURG SIGNATURE ESSENTIAL PF LTX SZ8

## (undated) DEVICE — BNDG ESMARK STRL 6INX12FT LF

## (undated) DEVICE — TBG PENCL TELESCP MEGADYNE SMOKE EVAC 10FT

## (undated) DEVICE — SYR LUERLOK 30CC

## (undated) DEVICE — SUT VIC 0 TN 27IN DYED JTN0G

## (undated) DEVICE — SOL NACL 0.9PCT 1000ML

## (undated) DEVICE — DISPOSABLE TOURNIQUET CUFF SINGLE BLADDER, SINGLE PORT AND QUICK CONNECT CONNECTOR: Brand: COLOR CUFF

## (undated) DEVICE — TRAP FLD MINIVAC MEGADYNE 100ML

## (undated) DEVICE — ENDOPOUCH RETRIEVER SPECIMEN RETRIEVAL BAGS: Brand: ENDOPOUCH RETRIEVER

## (undated) DEVICE — TBG PUMP ARTHSCP MAIN AR6400 16FT

## (undated) DEVICE — ANES CIRC EXTENDAFLEX-LF: Brand: MEDLINE INDUSTRIES, INC.

## (undated) DEVICE — APPL CHLORAPREP HI/LITE 26ML ORNG